# Patient Record
Sex: FEMALE | Race: WHITE | Employment: FULL TIME | URBAN - METROPOLITAN AREA
[De-identification: names, ages, dates, MRNs, and addresses within clinical notes are randomized per-mention and may not be internally consistent; named-entity substitution may affect disease eponyms.]

---

## 2017-03-07 ENCOUNTER — HOSPITAL ENCOUNTER (OUTPATIENT)
Dept: MAMMOGRAPHY | Facility: CLINIC | Age: 57
Discharge: HOME | End: 2017-03-07
Attending: FAMILY MEDICINE | Admitting: FAMILY MEDICINE
Payer: COMMERCIAL

## 2017-03-07 DIAGNOSIS — Z12.31 VISIT FOR SCREENING MAMMOGRAM: ICD-10-CM

## 2017-03-07 PROCEDURE — G0202 SCR MAMMO BI INCL CAD: HCPCS

## 2017-03-07 PROCEDURE — 77063 BREAST TOMOSYNTHESIS BI: CPT

## 2017-04-06 ENCOUNTER — OFFICE VISIT (OUTPATIENT)
Dept: FAMILY MEDICINE | Facility: CLINIC | Age: 57
End: 2017-04-06
Payer: COMMERCIAL

## 2017-04-06 VITALS
WEIGHT: 139 LBS | HEART RATE: 64 BPM | SYSTOLIC BLOOD PRESSURE: 148 MMHG | BODY MASS INDEX: 23.73 KG/M2 | DIASTOLIC BLOOD PRESSURE: 96 MMHG | HEIGHT: 64 IN | RESPIRATION RATE: 16 BRPM | TEMPERATURE: 98.2 F

## 2017-04-06 DIAGNOSIS — F17.200 TOBACCO USE DISORDER: Chronic | ICD-10-CM

## 2017-04-06 DIAGNOSIS — E55.9 VITAMIN D DEFICIENCY: Chronic | ICD-10-CM

## 2017-04-06 DIAGNOSIS — E03.9 HYPOTHYROIDISM, UNSPECIFIED TYPE: Primary | Chronic | ICD-10-CM

## 2017-04-06 DIAGNOSIS — F51.01 PRIMARY INSOMNIA: ICD-10-CM

## 2017-04-06 DIAGNOSIS — R53.83 FATIGUE, UNSPECIFIED TYPE: ICD-10-CM

## 2017-04-06 DIAGNOSIS — R03.0 ELEVATED BLOOD PRESSURE READING WITHOUT DIAGNOSIS OF HYPERTENSION: ICD-10-CM

## 2017-04-06 PROCEDURE — 99214 OFFICE O/P EST MOD 30 MIN: CPT | Performed by: FAMILY MEDICINE

## 2017-04-06 PROCEDURE — 36415 COLL VENOUS BLD VENIPUNCTURE: CPT | Performed by: FAMILY MEDICINE

## 2017-04-06 PROCEDURE — 84443 ASSAY THYROID STIM HORMONE: CPT | Performed by: FAMILY MEDICINE

## 2017-04-06 NOTE — NURSING NOTE
"Chief Complaint   Patient presents with     Thyroid Disease     refills       Initial BP (!) 148/96 (BP Location: Left arm, Cuff Size: Adult Regular)  Pulse 64  Temp 98.2  F (36.8  C) (Tympanic)  Resp 16  Ht 5' 4\" (1.626 m)  Wt 139 lb (63 kg)  Breastfeeding? No  BMI 23.86 kg/m2 Estimated body mass index is 23.86 kg/(m^2) as calculated from the following:    Height as of this encounter: 5' 4\" (1.626 m).    Weight as of this encounter: 139 lb (63 kg).  Medication Reconciliation: complete    Health Maintenance that is potentially due pending provider review:  PHQ9    Possibly completing today per provider review.    Callie Monge, CMA    "

## 2017-04-06 NOTE — MR AVS SNAPSHOT
After Visit Summary   4/6/2017    MS Katharine Harmon    MRN: [id number]           Patient Information     Date Of Birth          1960        Visit Information        Provider Department      4/6/2017 2:00 PM Chicho Koroma MD [facility]        Today's Diagnoses     Hypothyroidism, unspecified type    -  1    Elevated blood pressure reading without diagnosis of hypertension        Tobacco use disorder        Vitamin D deficiency        Primary insomnia        Fatigue, unspecified type           Follow-ups after your visit        Additional Services     SLEEP EVALUATION & MANAGEMENT REFERRAL - ADULT       Please be aware that coverage of these services is subject to the terms and limitations of your health insurance plan.  Call member services at your health plan with any benefit or coverage questions.      Please bring the following to your appointment:    >>   List of current medications   >>   This referral request   >>   Any documents/labs given to you for this referral    [facility] Lab  Ph [phone] (Age 2 and up)                  Future tests that were ordered for you today     Open Future Orders        Priority Expected Expires Ordered    SLEEP EVALUATION & MANAGEMENT REFERRAL - ADULT Routine  4/6/2018 4/6/2017            Who to contact     If you have questions or need follow up information about today's clinic visit or your schedule please contact [facility] directly at [phone].  Normal or non-critical lab and imaging results will be communicated to you by MyChart, letter or phone within 4 business days after the clinic has received the results. If you do not hear from us within 7 days, please contact the clinic through MyChart or phone. If you have a critical or abnormal lab result, we will notify you by phone as soon as possible.  Submit refill requests through AppSociallyt or call your pharmacy and they will forward the refill  "request to us. Please allow 3 business days for your refill to be completed.          Additional Information About Your Visit        MyChart Information     Exos lets you send messages to your doctor, view your test results, renew your prescriptions, schedule appointments and more. To sign up, go to [email or web address] . Click on \"Log in\" on the left side of the screen, which will take you to the Welcome page. Then click on \"Sign up Now\" on the right side of the page.     You will be asked to enter the access code listed below, as well as some personal information. Please follow the directions to create your username and password.     Your access code is: [email or web address]  Expires: 2017  2:59 PM     Your access code will  in 90 days. If you need help or a new code, please call your [place] clinic or [phone].        Care EveryWhere ID     This is your [facility] EveryWhere ID. This could be used by other organizations to access your [place] medical records  [id number]        Your Vitals Were     Pulse Temperature Respirations Height Breastfeeding? BMI (Body Mass Index)    64 98.2  F (36.8  C) (Tympanic) 16 5' 4\" (1.626 m) No 23.86 kg/m2       Blood Pressure from Last 3 Encounters:   17 (!) 148/96   16 152/88   16 156/90    Weight from Last 3 Encounters:   17 139 lb (63 kg)   16 141 lb (64 kg)   16 143 lb (64.9 kg)              We Performed the Following     TSH with free T4 reflex        Primary Care Provider Office Phone # Fax #    Chicho Koroma [phone]       [facility] [address] [facility] [address]        Thank you!     Thank you for choosing [facility]  for your care. Our goal is always to provide you with excellent care. Hearing back from our patients is one way we can continue to improve our services. Please take a few minutes to complete the written survey that you may receive in the mail after " your visit with us. Thank you!             Your Updated Medication List - Protect others around you: Learn how to safely use, store and throw away your medicines at www.disposemymeds.org.          This list is accurate as of: 4/6/17  3:15 PM.  Always use your most recent med list.                   Brand Name Dispense Instructions for use    levothyroxine 88 MCG tablet    SYNTHROID    90 tablet    Take 1 tablet (88 mcg) by mouth daily       * nicotine 14 MG/24HR 24 hr patch    NICODERM CQ    30 patch    Place 1 patch onto the skin every 24 hours       * nicotine 7 MG/24HR 24 hr patch    NICODERM CQ    30 patch    Place 1 patch onto the skin every 24 hours       ONE DAILY WOMENS 50+ PO      Take 1 tablet by mouth every evening       * Notice:  This list has 2 medication(s) that are the same as other medications prescribed for you. Read the directions carefully, and ask your doctor or other care provider to review them with you.

## 2017-04-06 NOTE — PROGRESS NOTES
"  SUBJECTIVE:                                                    Katharine Harmon is a 56 year old female who presents to clinic today for the following health issues:       Hypothyroidism Follow-up      Since last visit, patient describes the following symptoms: dry skin and fatigue, not sleeping well       Amount of exercise or physical activity: staying active    Problems taking medications regularly: No    Medication side effects: none    Diet: regular (no restrictions)      S: Katharine Harmon is a 56 year old female with hypothyroid.  Fills and labs  Insomnia: snores, cleft palate in past, s/p repair.  Not sleeping well, tired.  Not sure why.  Wonders about sleep test  Vit D deficiency:  Takes replacement a few days a week.   Tobacco use: trying to quit  Borderline BP: following at home with machine, calibrated well today.  Runs just around upper limit.      Problem list, med list, additional histories reviewed and updated, as indicated.      No cp or sob    O:BP (!) 148/96 (BP Location: Left arm, Cuff Size: Adult Regular)  Pulse 64  Temp 98.2  F (36.8  C) (Tympanic)  Resp 16  Ht 5' 4\" (1.626 m)  Wt 139 lb (63 kg)  Breastfeeding? No  BMI 23.86 kg/m2  GEN: Alert and oriented, in no acute distress  CV: RRR, no murmur  RESP: lungs clear bilaterally, good effort  EXT: no edema or lesions noted in lower extremities    A: hypothyroid.  Fills and labs  Insomnia: snores, cleft palate in past, s/p repair.    Vit D deficiency:  Takes replacement a few days a week.   Tobacco use: trying to quit  Borderline BP: following at home with machine    P: wants to keep an eye on bP.  Knows quitting smoking would help the most.  Willing to start meds in future if needed.    Sleep study seems reasonable.   Continue vit D as above   Is motivated to use patches to quit, encouraged here.   Might try melatonin lower dose, didn't like higher dose.  Tylenol PM an option short term as well    Tobacco Cessation Action Plan: thinking " about it, see above

## 2017-04-07 DIAGNOSIS — E03.9 HYPOTHYROIDISM: ICD-10-CM

## 2017-04-07 LAB — TSH SERPL DL<=0.005 MIU/L-ACNC: 1.1 MU/L (ref 0.4–4)

## 2017-04-07 RX ORDER — LEVOTHYROXINE SODIUM 88 UG/1
TABLET ORAL
Qty: 90 TABLET | Refills: 1 | Status: SHIPPED | OUTPATIENT
Start: 2017-04-07 | End: 2017-11-24

## 2017-04-07 NOTE — TELEPHONE ENCOUNTER
Notes Recorded by Chicho Koroma MD on 4/7/2017 at 2:48 PM  please send patient results    Thyroid testing showing that you're on the  right dose of thyroid hormone.  No need to make any changes.    I hope things are going well.    Refilled.  Stephanie Alvarez RN

## 2017-04-07 NOTE — TELEPHONE ENCOUNTER
levothyroxine (SYNTHROID) 88 MCG tablet     Last Written Prescription Date: 4/18/2016  Last Quantity: 90, # refills: 3  Last Office Visit with [facility], [facility] or [facility] prescribing provider: 4/6/2017        TSH   Date Value Ref Range Status   04/06/2017 1.10 0.40 - 4.00 mU/L Final

## 2017-04-10 ASSESSMENT — ANXIETY QUESTIONNAIRES
6. BECOMING EASILY ANNOYED OR IRRITABLE: NOT AT ALL
2. NOT BEING ABLE TO STOP OR CONTROL WORRYING: NOT AT ALL
7. FEELING AFRAID AS IF SOMETHING AWFUL MIGHT HAPPEN: NOT AT ALL
1. FEELING NERVOUS, ANXIOUS, OR ON EDGE: NOT AT ALL
GAD7 TOTAL SCORE: 3
5. BEING SO RESTLESS THAT IT IS HARD TO SIT STILL: NOT AT ALL
3. WORRYING TOO MUCH ABOUT DIFFERENT THINGS: MORE THAN HALF THE DAYS

## 2017-04-10 ASSESSMENT — PATIENT HEALTH QUESTIONNAIRE - PHQ9: 5. POOR APPETITE OR OVEREATING: SEVERAL DAYS

## 2017-04-11 ASSESSMENT — PATIENT HEALTH QUESTIONNAIRE - PHQ9: SUM OF ALL RESPONSES TO PHQ QUESTIONS 1-9: 12

## 2017-04-11 ASSESSMENT — ANXIETY QUESTIONNAIRES: GAD7 TOTAL SCORE: 3

## 2017-11-08 ENCOUNTER — TELEPHONE (OUTPATIENT)
Dept: FAMILY MEDICINE | Facility: CLINIC | Age: 57
End: 2017-11-08

## 2017-11-08 NOTE — TELEPHONE ENCOUNTER
Katharine has appointment today at 11:20am with Dr Koroma states made yesterday when b/p running high 188/119 [place] [facility] a lot of anxiety. States this am 124/84 and 123/87 so asking if she should keep appointment.    Kacey Perrin CSS

## 2017-11-08 NOTE — TELEPHONE ENCOUNTER
BP Readings from Last 6 Encounters:   04/06/17 (!) 148/96   08/01/16 152/88   06/28/16 156/90   04/07/16 (!) 146/92   02/19/16 128/60   12/02/15 (!) 173/95     Spoke to pt, discussed monitoring her BP readings at home, recording them, discussed s/s of hypertension and also informed her she can make an appt for BP check only also, pt agrees and will cancel her appt.  Stephanie Alvarez RN

## 2017-11-20 ENCOUNTER — ALLIED HEALTH/NURSE VISIT (OUTPATIENT)
Dept: FAMILY MEDICINE | Facility: CLINIC | Age: 57
End: 2017-11-20
Payer: COMMERCIAL

## 2017-11-20 VITALS — DIASTOLIC BLOOD PRESSURE: 84 MMHG | SYSTOLIC BLOOD PRESSURE: 134 MMHG | HEART RATE: 68 BPM

## 2017-11-20 DIAGNOSIS — R03.0 ELEVATED BLOOD PRESSURE READING WITHOUT DIAGNOSIS OF HYPERTENSION: Primary | ICD-10-CM

## 2017-11-20 PROCEDURE — 99207 ZZC NO CHARGE NURSE ONLY: CPT

## 2017-11-20 NOTE — PROGRESS NOTES
S-(situation): RN visit for BP check.     B-(background): Hx elevated BP, no antihypertensive meds. States BP elevated at dentist appt last week- unable to proceed with appt. Home readings variable- had 148/119 reading.    A-(assessment):   BP Readings from Last 6 Encounters:   11/20/17 134/84   04/06/17 (!) 148/96   08/01/16 152/88   06/28/16 156/90   04/07/16 (!) 146/92   02/19/16 128/60     HR 68.  States has had some pressure in ears, nasal congestion. Has felt tired for some time.    R-(recommendations): Advised F/U appt with PCP, bring home readings/ BP monitor in for comparison.  Pt voices understanding/ agreement. YANET Luis RN

## 2017-11-20 NOTE — MR AVS SNAPSHOT
"              After Visit Summary   2017    Katharine Harmon    MRN: [id number]           Patient Information     Date Of Birth          1960        Visit Information        Provider Department      2017 2:45 PM SACHIN BENNETT RN [facility]        Today's Diagnoses     Elevated blood pressure reading without diagnosis of hypertension    -  1       Follow-ups after your visit        Who to contact     If you have questions or need follow up information about today's clinic visit or your schedule please contact [facility] directly at [phone].  Normal or non-critical lab and imaging results will be communicated to you by DigitalVisionhart, letter or phone within 4 business days after the clinic has received the results. If you do not hear from us within 7 days, please contact the clinic through DigitalVisionhart or phone. If you have a critical or abnormal lab result, we will notify you by phone as soon as possible.  Submit refill requests through Festicket or call your pharmacy and they will forward the refill request to us. Please allow 3 business days for your refill to be completed.          Additional Information About Your Visit        MyChart Information     Festicket lets you send messages to your doctor, view your test results, renew your prescriptions, schedule appointments and more. To sign up, go to [email or web address] . Click on \"Log in\" on the left side of the screen, which will take you to the Welcome page. Then click on \"Sign up Now\" on the right side of the page.     You will be asked to enter the access code listed below, as well as some personal information. Please follow the directions to create your username and password.     Your access code is: [email or web address]  Expires: 2018  3:02 PM     Your access code will  in 90 days. If you need help or a new code, please call your [facility] or [phone].        Care EveryWhere ID     This is your Care EveryWhere ID. " This could be used by other organizations to access your [place] medical records  [id number]        Your Vitals Were     Pulse                   68            Blood Pressure from Last 3 Encounters:   11/20/17 134/84   04/06/17 (!) 148/96   08/01/16 152/88    Weight from Last 3 Encounters:   04/06/17 139 lb (63 kg)   08/01/16 141 lb (64 kg)   06/28/16 143 lb (64.9 kg)              Today, you had the following     No orders found for display       Primary Care Provider Office Phone # Fax #    Chicho Koroma [phone] [address] [facility] [address]        Equal Access to Services     AD LORENZO : Micaela barreto Socynthia, waenriqueda jose david, qaybta kaalmada sujey, harmony arroyo . So [facility] [phone].    ATENCIÓN: Si habla español, tiene a baptiste disposición servicios gratuitos de asistencia lingüística. Llame al [phone].    We comply with applicable federal civil rights laws and [place] laws. We do not discriminate on the basis of race, color, national origin, age, disability, sex, sexual orientation, or gender identity.            Thank you!     Thank you for choosing [facility]  for your care. Our goal is always to provide you with excellent care. Hearing back from our patients is one way we can continue to improve our services. Please take a few minutes to complete the written survey that you may receive in the mail after your visit with us. Thank you!             Your Updated Medication List - Protect others around you: Learn how to safely use, store and throw away your medicines at www.disposemymeds.org.          This list is accurate as of: 11/20/17  3:02 PM.  Always use your most recent med list.                   Brand Name Dispense Instructions for use Diagnosis    levothyroxine 88 MCG tablet    SYNTHROID/LEVOTHROID    90 tablet    TAKE ONE TABLET BY MOUTH ONCE DAILY    Hypothyroidism       * nicotine 14 MG/24HR 24  hr patch    NICODERM CQ    30 patch    Place 1 patch onto the skin every 24 hours    Tobacco use disorder       * nicotine 7 MG/24HR 24 hr patch    NICODERM CQ    30 patch    Place 1 patch onto the skin every 24 hours    Tobacco use disorder       ONE DAILY WOMENS 50+ PO      Take 1 tablet by mouth every evening        * Notice:  This list has 2 medication(s) that are the same as other medications prescribed for you. Read the directions carefully, and ask your doctor or other care provider to review them with you.

## 2017-11-24 DIAGNOSIS — E03.9 HYPOTHYROIDISM: ICD-10-CM

## 2017-11-24 RX ORDER — LEVOTHYROXINE SODIUM 88 UG/1
TABLET ORAL
Qty: 90 TABLET | Refills: 1 | Status: SHIPPED | OUTPATIENT
Start: 2017-11-24 | End: 2018-05-21

## 2017-11-24 NOTE — TELEPHONE ENCOUNTER
TSH   Date Value Ref Range Status   04/06/2017 1.10 0.40 - 4.00 mU/L Final   04/07/2016 1.37 0.40 - 4.00 mU/L Final   09/16/2015 0.57 0.40 - 4.00 mU/L Final     Refilled.  YANET Luis, RN

## 2018-02-07 ENCOUNTER — TELEPHONE (OUTPATIENT)
Dept: FAMILY MEDICINE | Facility: CLINIC | Age: 58
End: 2018-02-07

## 2018-02-07 ENCOUNTER — ALLIED HEALTH/NURSE VISIT (OUTPATIENT)
Dept: FAMILY MEDICINE | Facility: CLINIC | Age: 58
End: 2018-02-07
Payer: COMMERCIAL

## 2018-02-07 ENCOUNTER — OFFICE VISIT (OUTPATIENT)
Dept: FAMILY MEDICINE | Facility: CLINIC | Age: 58
End: 2018-02-07
Payer: COMMERCIAL

## 2018-02-07 ENCOUNTER — HOSPITAL ENCOUNTER (EMERGENCY)
Facility: CLINIC | Age: 58
Discharge: HOME | End: 2018-02-07
Attending: FAMILY MEDICINE | Admitting: FAMILY MEDICINE
Payer: COMMERCIAL

## 2018-02-07 ENCOUNTER — APPOINTMENT (OUTPATIENT)
Dept: GENERAL RADIOLOGY | Facility: CLINIC | Age: 58
End: 2018-02-07
Attending: FAMILY MEDICINE
Payer: COMMERCIAL

## 2018-02-07 VITALS
BODY MASS INDEX: 23.05 KG/M2 | WEIGHT: 135 LBS | HEART RATE: 74 BPM | SYSTOLIC BLOOD PRESSURE: 125 MMHG | DIASTOLIC BLOOD PRESSURE: 86 MMHG | OXYGEN SATURATION: 95 % | HEIGHT: 64 IN | RESPIRATION RATE: 11 BRPM | TEMPERATURE: 97.9 F

## 2018-02-07 VITALS — DIASTOLIC BLOOD PRESSURE: 98 MMHG | HEART RATE: 84 BPM | SYSTOLIC BLOOD PRESSURE: 170 MMHG

## 2018-02-07 DIAGNOSIS — R06.02 SHORTNESS OF BREATH: Primary | ICD-10-CM

## 2018-02-07 DIAGNOSIS — R20.2 PARESTHESIA: ICD-10-CM

## 2018-02-07 DIAGNOSIS — R03.0 ELEVATED BLOOD PRESSURE READING WITHOUT DIAGNOSIS OF HYPERTENSION: ICD-10-CM

## 2018-02-07 DIAGNOSIS — R06.4 HYPERVENTILATION: ICD-10-CM

## 2018-02-07 DIAGNOSIS — Z01.30 BLOOD PRESSURE CHECK: Primary | ICD-10-CM

## 2018-02-07 DIAGNOSIS — R42 DIZZINESS: ICD-10-CM

## 2018-02-07 LAB
ANION GAP SERPL CALCULATED.3IONS-SCNC: 9 MMOL/L (ref 3–14)
BASOPHILS # BLD AUTO: 0.1 10E9/L (ref 0–0.2)
BASOPHILS NFR BLD AUTO: 0.6 %
BUN SERPL-MCNC: 14 MG/DL (ref 7–30)
CALCIUM SERPL-MCNC: 9.5 MG/DL (ref 8.5–10.1)
CHLORIDE SERPL-SCNC: 106 MMOL/L (ref 94–109)
CO2 SERPL-SCNC: 25 MMOL/L (ref 20–32)
CREAT SERPL-MCNC: 0.72 MG/DL (ref 0.52–1.04)
DIFFERENTIAL METHOD BLD: ABNORMAL
EOSINOPHIL # BLD AUTO: 0.1 10E9/L (ref 0–0.7)
EOSINOPHIL NFR BLD AUTO: 0.7 %
ERYTHROCYTE [DISTWIDTH] IN BLOOD BY AUTOMATED COUNT: 14 % (ref 10–15)
GFR SERPL CREATININE-BSD FRML MDRD: 83 ML/MIN/1.7M2
GLUCOSE SERPL-MCNC: 90 MG/DL (ref 70–99)
HCT VFR BLD AUTO: 46.1 % (ref 35–47)
HGB BLD-MCNC: 16.1 G/DL (ref 11.7–15.7)
IMM GRANULOCYTES # BLD: 0 10E9/L (ref 0–0.4)
IMM GRANULOCYTES NFR BLD: 0.2 %
LYMPHOCYTES # BLD AUTO: 3 10E9/L (ref 0.8–5.3)
LYMPHOCYTES NFR BLD AUTO: 27.2 %
MCH RBC QN AUTO: 30.1 PG (ref 26.5–33)
MCHC RBC AUTO-ENTMCNC: 34.9 G/DL (ref 31.5–36.5)
MCV RBC AUTO: 86 FL (ref 78–100)
MONOCYTES # BLD AUTO: 1 10E9/L (ref 0–1.3)
MONOCYTES NFR BLD AUTO: 9.1 %
NEUTROPHILS # BLD AUTO: 6.8 10E9/L (ref 1.6–8.3)
NEUTROPHILS NFR BLD AUTO: 62.2 %
PLATELET # BLD AUTO: 388 10E9/L (ref 150–450)
POTASSIUM SERPL-SCNC: 3.5 MMOL/L (ref 3.4–5.3)
RBC # BLD AUTO: 5.35 10E12/L (ref 3.8–5.2)
SODIUM SERPL-SCNC: 140 MMOL/L (ref 133–144)
TROPONIN I SERPL-MCNC: <0.015 UG/L (ref 0–0.04)
TSH SERPL DL<=0.005 MIU/L-ACNC: 2.12 MU/L (ref 0.4–4)
WBC # BLD AUTO: 10.9 10E9/L (ref 4–11)

## 2018-02-07 PROCEDURE — 93005 ELECTROCARDIOGRAM TRACING: CPT | Performed by: FAMILY MEDICINE

## 2018-02-07 PROCEDURE — 25000128 H RX IP 250 OP 636: Performed by: FAMILY MEDICINE

## 2018-02-07 PROCEDURE — 99215 OFFICE O/P EST HI 40 MIN: CPT | Performed by: FAMILY MEDICINE

## 2018-02-07 PROCEDURE — 93010 ELECTROCARDIOGRAM REPORT: CPT | Mod: Z6 | Performed by: FAMILY MEDICINE

## 2018-02-07 PROCEDURE — 96374 THER/PROPH/DIAG INJ IV PUSH: CPT | Performed by: FAMILY MEDICINE

## 2018-02-07 PROCEDURE — 71046 X-RAY EXAM CHEST 2 VIEWS: CPT

## 2018-02-07 PROCEDURE — 99285 EMERGENCY DEPT VISIT HI MDM: CPT | Mod: 25 | Performed by: FAMILY MEDICINE

## 2018-02-07 PROCEDURE — 80048 BASIC METABOLIC PNL TOTAL CA: CPT | Performed by: FAMILY MEDICINE

## 2018-02-07 PROCEDURE — 84443 ASSAY THYROID STIM HORMONE: CPT | Performed by: FAMILY MEDICINE

## 2018-02-07 PROCEDURE — 85025 COMPLETE CBC W/AUTO DIFF WBC: CPT | Performed by: FAMILY MEDICINE

## 2018-02-07 PROCEDURE — 84484 ASSAY OF TROPONIN QUANT: CPT | Performed by: FAMILY MEDICINE

## 2018-02-07 PROCEDURE — 93000 ELECTROCARDIOGRAM COMPLETE: CPT | Performed by: FAMILY MEDICINE

## 2018-02-07 PROCEDURE — 99207 ZZC NO CHARGE NURSE ONLY: CPT

## 2018-02-07 RX ORDER — LORAZEPAM 2 MG/ML
0.5 INJECTION INTRAMUSCULAR
Status: DISCONTINUED | OUTPATIENT
Start: 2018-02-07 | End: 2018-02-07 | Stop reason: HOSPADM

## 2018-02-07 RX ADMIN — LORAZEPAM 0.5 MG: 2 INJECTION INTRAMUSCULAR at 17:15

## 2018-02-07 NOTE — ED AVS SNAPSHOT
[facility] Emergency Department    [address] [address]    Phone:  [phone]    Fax:  [phone]                                       Katharine Harmon   MRN: [id number]    Department:  [facility] Emergency Department   Date of Visit:  2/7/2018           After Visit Summary Signature Page     I have received my discharge instructions, and my questions have been answered. I have discussed any challenges I see with this plan with the nurse or doctor.    ..........................................................................................................................................  Patient/Patient Representative Signature      ..........................................................................................................................................  Patient Representative Print Name and Relationship to Patient    ..................................................               ................................................  Date                                            Time    ..........................................................................................................................................  Reviewed by Signature/Title    ...................................................              ..............................................  Date                                                            Time

## 2018-02-07 NOTE — TELEPHONE ENCOUNTER
"Hour ago was feeling heart was racing, dizzy, short of breath, feet and hands are tingly, no chest pain, but patient is short of breath, and feels \"fuzzy\". Patient is unsteady with walking and seems anxious. Discussed with Dr. Miller who visually evaluated patient and advised EKG now. VS are elevated B/P 170/98, pulse is 84. Dr. Miller advised ER, patient called friend who will take the patient to ER at [place]. Friend Katharine is here to escort the patient down to [place].  DIEGO Burns  "

## 2018-02-07 NOTE — ED AVS SNAPSHOT
[facility] Emergency Department    [address] [address]    Phone:  [phone]    Fax:  [phone]                                       Katharine Harmon   MRN: [id number]    Department:  [facility] Emergency Department   Date of Visit:  2/7/2018           Patient Information     Date Of Birth          1960        Your diagnoses for this visit were:     Hyperventilation        You were seen by Omar Castañeda MD.        Discharge Instructions       Return to the Emergency Room if the following occurs:     New chest pain, worsened breathing, fainting, or for any concern at anytime.    Or, follow-up with the following provider as we discussed:     Return to your primary doctor this week or next for reevaluation.    Medications discussed:    None new.  No changes.    If you received pain-relieving or sedating medication during your time in the ER, avoid alcohol, driving automobiles, or working with machinery.  Also, a responsible adult must stay with you.        Call the Nurse Advice Line at [phone] or [phone] for any concern at anytime.      24 Hour Appointment Hotline       To make an appointment at any [facility] clinic, call [phone] ([phone]). If you don't have a family doctor or clinic, we will help you find one. [facility] clinics are conveniently located to serve the needs of you and your family.             Review of your medicines      Our records show that you are taking the medicines listed below. If these are incorrect, please call your family doctor or clinic.        Dose / Directions Last dose taken    levothyroxine 88 MCG tablet   Commonly known as:  SYNTHROID/LEVOTHROID   Quantity:  90 tablet        TAKE ONE TABLET BY MOUTH ONCE DAILY   Refills:  1        ONE DAILY WOMENS 50+ PO   Dose:  1 tablet        Take 1 tablet by mouth every evening   Refills:  0                Procedures and tests performed during your visit     Basic metabolic  panel    CBC with platelets, differential    EKG 12-lead, tracing only    TSH with free T4 reflex    Troponin I    XR Chest 2 Views      Orders Needing Specimen Collection     None      Pending Results     Date and Time Order Name Status Description    2/7/2018 1740 XR Chest 2 Views Preliminary             Pending Culture Results     No orders found from 2/5/2018 to 2/8/2018.            Pending Results Instructions     If you had any lab results that were not finalized at the time of your Discharge, you can call the ED Lab Result RN at [phone]. You will be contacted by this team for any positive Lab results or changes in treatment. The nurses are available 7 days a week from 10A to 6:30P.  You can leave a message 24 hours per day and they will return your call.        Test Results From Your Hospital Stay        2/7/2018  4:57 PM      Component Results     Component Value Ref Range & Units Status    WBC 10.9 4.0 - 11.0 10e9/L Final    RBC Count 5.35 (H) 3.8 - 5.2 10e12/L Final    Hemoglobin 16.1 (H) 11.7 - 15.7 g/dL Final    Hematocrit 46.1 35.0 - 47.0 % Final    MCV 86 78 - 100 fl Final    MCH 30.1 26.5 - 33.0 pg Final    MCHC 34.9 31.5 - 36.5 g/dL Final    RDW 14.0 10.0 - 15.0 % Final    Platelet Count 388 150 - 450 10e9/L Final    Diff Method Automated Method  Final    % Neutrophils 62.2 % Final    % Lymphocytes 27.2 % Final    % Monocytes 9.1 % Final    % Eosinophils 0.7 % Final    % Basophils 0.6 % Final    % Immature Granulocytes 0.2 % Final    Absolute Neutrophil 6.8 1.6 - 8.3 10e9/L Final    Absolute Lymphocytes 3.0 0.8 - 5.3 10e9/L Final    Absolute Monocytes 1.0 0.0 - 1.3 10e9/L Final    Absolute Eosinophils 0.1 0.0 - 0.7 10e9/L Final    Absolute Basophils 0.1 0.0 - 0.2 10e9/L Final    Abs Immature Granulocytes 0.0 0 - 0.4 10e9/L Final         2/7/2018  5:15 PM      Component Results     Component Value Ref Range & Units Status    Sodium 140 133 - 144 mmol/L Final    Potassium 3.5 3.4 - 5.3 mmol/L  "Final    Chloride 106 94 - 109 mmol/L Final    Carbon Dioxide 25 20 - 32 mmol/L Final    Anion Gap 9 3 - 14 mmol/L Final    Glucose 90 70 - 99 mg/dL Final    Urea Nitrogen 14 7 - 30 mg/dL Final    Creatinine 0.72 0.52 - 1.04 mg/dL Final    GFR Estimate 83 >60 mL/min/1.7m2 Final    Non  GFR Calc    GFR Estimate If Black >90 >60 mL/min/1.7m2 Final    African American GFR Calc    Calcium 9.5 8.5 - 10.1 mg/dL Final         2/7/2018  5:15 PM      Component Results     Component Value Ref Range & Units Status    Troponin I ES <0.015 0.000 - 0.045 ug/L Final    The 99th percentile for upper reference range is 0.045 ug/L.  Troponin values   in the range of 0.045 - 0.120 ug/L may be associated with risks of adverse   clinical events.           2/7/2018  6:18 PM      Component Results     Component Value Ref Range & Units Status    TSH 2.12 0.40 - 4.00 mU/L Final         2/7/2018  6:01 PM      Narrative     CHEST TWO VIEWS    2/7/2018 6:00 PM     HISTORY: Shortness of breath.    COMPARISON: None.    FINDINGS: The heart size is normal. The lungs are clear. No  pneumothorax or pleural effusion.        Impression     IMPRESSION: No acute abnormality.                Thank you for choosing [facility]       Thank you for choosing [facility] for your care. Our goal is always to provide you with excellent care. Hearing back from our patients is one way we can continue to improve our services. Please take a few minutes to complete the written survey that you may receive in the mail after you visit with us. Thank you!        Zarpo Information     Zarpo lets you send messages to your doctor, view your test results, renew your prescriptions, schedule appointments and more. To sign up, go to [email or web address] . Click on \"Log in\" on the left side of the screen, which will take you to the Welcome page. Then click on \"Sign up Now\" on the right side of the page.     You will be asked to enter the access code listed " below, as well as some personal information. Please follow the directions to create your username and password.     Your access code is: [email or web address]  Expires: 2018  3:02 PM     Your access code will  in 90 days. If you need help or a new code, please call your [place] clinic or [phone].        Care EveryWhere ID     This is your Care EveryWhere ID. This could be used by other organizations to access your [place] medical records  [id number]        Equal Access to Services     MINAL [facility]ROD[facility]IGO : Micaela barreto Socynthia, wanoe luqadaha, qasusan kaalmasusan rm, harmony arroyo . So [facility] [phone].    ATENCIÓN: Si habla español, tiene a baptiste disposición servicios gratuitos de asistencia lingüística. Llame al [phone].    We comply with applicable federal civil rights laws and [place] laws. We do not discriminate on the basis of race, color, national origin, age, disability, sex, sexual orientation, or gender identity.            After Visit Summary       This is your record. Keep this with you and show to your community pharmacist(s) and doctor(s) at your next visit.

## 2018-02-07 NOTE — PROGRESS NOTES
SUBJECTIVE:   Katharine Harmon is a 57 year old female who presents to clinic today for the following health issues:    Shortness of breath  57-year-old m female presents with shortness of breath, dizziness/lightheadedness along with numbness and tingling.  Symptoms started at around noon today.  She denies any chest pain, palpitation, cough, bowel/bladder or other relevant systemic symptoms.  Past medical history significant for hyperlipidemia, hypothyroidism, tobacco abuse and depression.  She concerns that elevated blood pressure might be contributing her symptoms.  She smokes cigarettes daily, drinks alcohol occasionally and denies using any illicit drugs.        Problem list and histories reviewed & adjusted, as indicated.  Additional history: as documented    Patient Active Problem List   Diagnosis     Fracture dislocation of ankle     Hypothyroidism     Vitamin D deficiency     Tobacco use disorder     Cleft palate     Hyperlipidemia with target LDL less than 100     Major depressive disorder, single episode, moderate (H)     Elevated blood pressure reading without diagnosis of hypertension     S/P hysterectomy     Chronic pain of right ankle     Past Surgical History:   Procedure Laterality Date     ARTHROSCOPY ANKLE Right 6/25/2015    Procedure: ARTHROSCOPY ANKLE;  Surgeon: Riley Hilton DPM;  Location: [facility]     C THYROID ABLATION  1999     HYSTERECTOMY VAGINAL      heavy menses     OPEN REDUCTION INTERNAL FIXATION ANKLE  4/23/2014    Procedure: OPEN REDUCTION INTERNAL FIXATION ANKLE;  Surgeon: Riley Hilton DPM;  Location: [facility]     REMOVE HARDWARE ANKLE Right 6/25/2015    Procedure: REMOVE HARDWARE ANKLE;  Surgeon: Riley Hilton DPM;  Location: [facility]     REPAIR CLEFT PALATE INFANT  age 6 months       Social History   Substance Use Topics     Smoking status: Current Every Day Smoker     Packs/day: 0.40     Years: 35.00     Types: Cigarettes     Smokeless tobacco: Never Used       Comment: Currently using patches - trying to quit     Alcohol use 0.0 oz/week     0 Standard drinks or equivalent per week      Comment: rare     Family History   Problem Relation Age of Onset     DIABETES Maternal Grandfather          Current Outpatient Prescriptions   Medication Sig Dispense Refill     levothyroxine (SYNTHROID/LEVOTHROID) 88 MCG tablet TAKE ONE TABLET BY MOUTH ONCE DAILY 90 tablet 1     Multiple Vitamins-Minerals (ONE DAILY WOMENS 50+ PO) Take 1 tablet by mouth every evening       Allergies   Allergen Reactions     Penicillins GI Disturbance     Recent Labs   Lab Test  02/07/18   1645  04/06/17   1502   09/16/15   1352 09/09/15   LDL   --    --    --   135*   --    HDL   --    --    --   55   --    TRIG   --    --    --   224*   --    ALT   --    --    --    --   46   CR  0.72   --    --    --   0.90   GFRESTIMATED  83   --    --    --    --    GFRESTBLACK  >90   --    --    --    --    POTASSIUM  3.5   --    --    --   4.3   TSH  2.12  1.10   < >  0.57   --     < > = values in this interval not displayed.      BP Readings from Last 3 Encounters:   02/07/18 125/86   02/07/18 (!) 170/98   02/07/18 (!) 170/98    Wt Readings from Last 3 Encounters:   02/07/18 135 lb (61.2 kg)   04/06/17 139 lb (63 kg)   08/01/16 141 lb (64 kg)                  Labs reviewed in EPIC    Reviewed and updated as needed this visit by clinical staff       Reviewed and updated as needed this visit by Provider         ROS:  Constitutional, HEENT, cardiovascular, pulmonary, GI, , musculoskeletal, neuro, skin, endocrine and psych systems are negative, except as otherwise noted.    OBJECTIVE:   BP (!) 170/98 (BP Location: Right arm, Patient Position: Sitting, Cuff Size: Adult Large)  Pulse 84  SpO2 97%  GENERAL: alert, looks anxious and dyspneic  EYES: Eyes grossly normal to inspection, PERRL and conjunctivae and sclerae normal  HENT: ear canals and TM's normal, nose and mouth without ulcers or lesions  NECK: no adenopathy,  no asymmetry, masses, or scars and thyroid normal to palpation  RESP: lungs clear to auscultation - no rales, rhonchi or wheezes  CV: regular rates and rhythm, normal S1 S2, no S3 or S4, no murmur, click or rub and peripheral pulses strong  ABDOMEN: soft, nontender, no hepatosplenomegaly, no masses and bowel sounds normal  MS: no gross musculoskeletal defects noted, no edema  SKIN: no suspicious lesions or rashes  NEURO: Normal strength and tone, mentation intact and speech normal  PSYCH: anxious, judgement and insight intact and appearance well groomed    ECG: Sinus rhythm, no ischemic changes noted    ASSESSMENT/PLAN:         ICD-10-CM    1. Shortness of breath R06.02 EKG 12-lead complete w/read - Clinics   2. Dizziness R42    3. Paresthesia R20.2    4. Elevated blood pressure reading without diagnosis of hypertension R03.0        57-year-old female presents with shortness of breath, dizziness and numbness/tingling involving both hands.  Blood pressure in clinic noted to be 170/98.  She appears anxious and dyspneic but otherwise physical examination quite unremarkable.  Past medical history significant for hyperlipidemia, hypothyroidism, tobacco abuse and depression.  EKG came back normal.  Differentials are broad including coronary ischemia, PE, uncontrolled hypertension and panic attack.  Needs further evaluation to delineate a specific diagnosis.  Recommended to go to ER.  ER physician informed.  Patient deferred ambulance, friend will be driving her to [place] ER.  Patient understood and in agreement with above plan.  All questions answered.  I spent 25 minutes during this encounter, greater than 50% of the time was spent on education, counseling, reviewing the plan of care, and coordination in regards to her specific conditions as described above.           Bert Miller MD  [facility]

## 2018-02-07 NOTE — MR AVS SNAPSHOT
"              After Visit Summary   2018    Katharine Harmon    MRN: [id number]           Patient Information     Date Of Birth          1960        Visit Information        Provider Department      2018 3:20 PM Bert Miller MD [facility]        Today's Diagnoses     Shortness of breath    -  1    Dizziness        Paresthesia        Elevated blood pressure reading without diagnosis of hypertension           Follow-ups after your visit        Who to contact     If you have questions or need follow up information about today's clinic visit or your schedule please contact [facility] directly at [phone].  Normal or non-critical lab and imaging results will be communicated to you by [employer], letter or phone within 4 business days after the clinic has received the results. If you do not hear from us within 7 days, please contact the clinic through [employer] or phone. If you have a critical or abnormal lab result, we will notify you by phone as soon as possible.  Submit refill requests through [employer]EX or call your pharmacy and they will forward the refill request to us. Please allow 3 business days for your refill to be completed.          Additional Information About Your Visit        MyChart Information     GMEX lets you send messages to your doctor, view your test results, renew your prescriptions, schedule appointments and more. To sign up, go to [email or web address] . Click on \"Log in\" on the left side of the screen, which will take you to the Welcome page. Then click on \"Sign up Now\" on the right side of the page.     You will be asked to enter the access code listed below, as well as some personal information. Please follow the directions to create your username and password.     Your access code is: [email or web address]  Expires: 2018  3:02 PM     Your access code will  in 90 days. If you need help or a new code, please call your [facility] or " [phone].        Care EveryWhere ID     This is your Care EveryWhere ID. This could be used by other organizations to access your [facility] medical records  [id number]        Your Vitals Were     Pulse Pulse Oximetry                84 97%           Blood Pressure from Last 3 Encounters:   02/07/18 125/86   02/07/18 (!) 170/98   02/07/18 (!) 170/98    Weight from Last 3 Encounters:   02/07/18 135 lb (61.2 kg)   04/06/17 139 lb (63 kg)   08/01/16 141 lb (64 kg)              We Performed the Following     EKG 12-lead complete w/read - Clinics        Primary Care Provider Office Phone # Fax #    Chicho Koroma [phone]       [address] [place] [phone]        Equal Access to Services     [facility] MAURA : Hadii elder quintana hadasho Soomaali, waaxda luqadaha, qaybta kaalmada adecheyenneyasusan, harmony arroyo . So [facility] [phone].    ATENCIÓN: Si habla español, tiene a baptiste disposición servicios gratuitos de asistencia lingüística. Katiana al [phone].    We comply with applicable federal civil rights laws and [place] laws. We do not discriminate on the basis of race, color, national origin, age, disability, sex, sexual orientation, or gender identity.            Thank you!     Thank you for choosing [facility]  for your care. Our goal is always to provide you with excellent care. Hearing back from our patients is one way we can continue to improve our services. Please take a few minutes to complete the written survey that you may receive in the mail after your visit with us. Thank you!             Your Updated Medication List - Protect others around you: Learn how to safely use, store and throw away your medicines at www.disposemymeds.org.          This list is accurate as of 2/7/18 11:59 PM.  Always use your most recent med list.                   Brand Name Dispense Instructions for use Diagnosis    levothyroxine 88 MCG tablet     SYNTHROID/LEVOTHROID    90 tablet    TAKE ONE TABLET BY MOUTH ONCE DAILY    Hypothyroidism       ONE DAILY WOMENS 50+ PO      Take 1 tablet by mouth every evening
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
## 2018-02-07 NOTE — MR AVS SNAPSHOT
"              After Visit Summary   2018    Katharine Harmon    MRN: [id number]           Patient Information     Date Of Birth          1960        Visit Information        Provider Department      2018 3:00 PM SACHIN BENNETT RN [facility]        Today's Diagnoses     Blood pressure check    -  1       Follow-ups after your visit        Who to contact     If you have questions or need follow up information about today's clinic visit or your schedule please contact [facility] directly at [phone].  Normal or non-critical lab and imaging results will be communicated to you by [employer], letter or phone within 4 business days after the clinic has received the results. If you do not hear from us within 7 days, please contact the clinic through [employer] or phone. If you have a critical or abnormal lab result, we will notify you by phone as soon as possible.  Submit refill requests through Nextdoor or call your pharmacy and they will forward the refill request to us. Please allow 3 business days for your refill to be completed.          Additional Information About Your Visit        MyChart Information     Nextdoor lets you send messages to your doctor, view your test results, renew your prescriptions, schedule appointments and more. To sign up, go to [email or web address] . Click on \"Log in\" on the left side of the screen, which will take you to the Welcome page. Then click on \"Sign up Now\" on the right side of the page.     You will be asked to enter the access code listed below, as well as some personal information. Please follow the directions to create your username and password.     Your access code is: [email or web address]  Expires: 2018  3:02 PM     Your access code will  in 90 days. If you need help or a new code, please call your [facility] or [phone].        Care EveryWhere ID     This is your Care EveryWhere ID. This could be used by other organizations to " access your [place] medical records  [id number]        Your Vitals Were     Pulse                   84            Blood Pressure from Last 3 Encounters:   02/07/18 (!) 170/98   11/20/17 134/84   04/06/17 (!) 148/96    Weight from Last 3 Encounters:   04/06/17 139 lb (63 kg)   08/01/16 141 lb (64 kg)   06/28/16 143 lb (64.9 kg)              Today, you had the following     No orders found for display       Primary Care Provider Office Phone # Fax #    Chicho Koroma [phone]       [address] [place] [phone]        Equal Access to Services     [facility]: Hadii elder quintana hadasho Socynthia, waaxda luqadaha, qaybta kaalmada adecheyenneyada, harmony arroyo . So [facility] [phone].    ATENCIÓN: Si habla español, tiene a baptiste disposición servicios gratuitos de asistencia lingüística. Llame al [phone].    We comply with applicable federal civil rights laws and [place] laws. We do not discriminate on the basis of race, color, national origin, age, disability, sex, sexual orientation, or gender identity.            Thank you!     Thank you for choosing [facility]  for your care. Our goal is always to provide you with excellent care. Hearing back from our patients is one way we can continue to improve our services. Please take a few minutes to complete the written survey that you may receive in the mail after your visit with us. Thank you!             Your Updated Medication List - Protect others around you: Learn how to safely use, store and throw away your medicines at www.disposemymeds.org.          This list is accurate as of 2/7/18  4:02 PM.  Always use your most recent med list.                   Brand Name Dispense Instructions for use Diagnosis    levothyroxine 88 MCG tablet    SYNTHROID/LEVOTHROID    90 tablet    TAKE ONE TABLET BY MOUTH ONCE DAILY    Hypothyroidism       * nicotine 14 MG/24HR 24 hr patch    NICODERM CQ    30 patch     Place 1 patch onto the skin every 24 hours    Tobacco use disorder       * nicotine 7 MG/24HR 24 hr patch    NICODERM CQ    30 patch    Place 1 patch onto the skin every 24 hours    Tobacco use disorder       ONE DAILY WOMENS 50+ PO      Take 1 tablet by mouth every evening        * Notice:  This list has 2 medication(s) that are the same as other medications prescribed for you. Read the directions carefully, and ask your doctor or other care provider to review them with you.

## 2018-02-07 NOTE — ED PROVIDER NOTES
HPI  Patient is a 57-year-old female presenting with shortness of breath and lightheadedness.  She has a known history of hypothyroidism and multiple surgeries.  Other past medical history is reviewed.  Medications reviewed.  She smokes.  Rare alcohol, none recently.  No illegal drug use.  She was seen in the clinic just prior to arrival.    Patient presents with shortness of breath, lightheadedness, and numbness and tingling.  She does report having similar symptoms in the past though not to the severity.  Onset of symptoms was about noon today.  She describes feeling lightheaded and like she cannot get a full breath.  She denies any episodes of chest pain or back pain.  She has had lightheadedness throughout the day.  She has felt numbness and tingling of her upper and lower extremities, bilaterally.  She has had paresthesia around her mouth.  She has felt anxious but denies obviously hyperventilating.  Her symptoms worsened as she sat in the clinic waiting for treatment.  She was concerned about high blood pressure causing her symptoms and this is why she went to the clinic.  She denies recent medication changes.  She smokes.  She drank alcohol on Saturday, 4 days ago, but none since.  No illegal drug.    ROS: All other review of systems are negative other than that noted above.     Past Medical History:   Diagnosis Date     Diagnostic skin and sensitization tests 12/2/15 skin tests pos. for:  DM/G only.      Thyroid disease      Past Surgical History:   Procedure Laterality Date     ARTHROSCOPY ANKLE Right 6/25/2015    Procedure: ARTHROSCOPY ANKLE;  Surgeon: Riley Hilton DPM;  Location: [facility]     C THYROID ABLATION  1999     HYSTERECTOMY VAGINAL      heavy menses     OPEN REDUCTION INTERNAL FIXATION ANKLE  4/23/2014    Procedure: OPEN REDUCTION INTERNAL FIXATION ANKLE;  Surgeon: Riley Hilton DPM;  Location: [facility]     REMOVE HARDWARE ANKLE Right 6/25/2015    Procedure: REMOVE HARDWARE ANKLE;   "Surgeon: Riley Hilton DPM;  Location: [facility]     REPAIR CLEFT PALATE INFANT  age 6 months     Family History   Problem Relation Age of Onset     DIABETES Maternal Grandfather      Social History   Substance Use Topics     Smoking status: Current Every Day Smoker     Packs/day: 0.40     Years: 35.00     Types: Cigarettes     Smokeless tobacco: Never Used      Comment: Currently using patches - trying to quit     Alcohol use 0.0 oz/week     0 Standard drinks or equivalent per week      Comment: rare         PHYSICAL  /86  Pulse 74  Temp 97.9  F (36.6  C) (Oral)  Resp 11  Ht 1.626 m (5' 4\")  Wt 61.2 kg (135 lb)  SpO2 95%  BMI 23.17 kg/m2  General: Patient is alert and in moderate to severe distress.  Her eyes are closed throughout the interview.  She lies flat with her head slightly an incline.  Cooperative, conversational.  Neurological: Alert.  Moving upper and lower extremities equally, bilaterally.  Head / Neck: Atraumatic.  Ears: Not done.  Eyes: Pupils are equal, round, and reactive.  Normal conjunctiva.  Nose: Midline.  No epistaxis.  Mouth / Throat: No ulcerations or lesions.  Upper pharynx is not erythematous.  Moist.  Respiratory: No respiratory distress. CTA B.  Cardiovascular: Regular rhythm.  Peripheral extremities are warm.  No edema.  No calf tenderness.  Abdomen / Pelvis: Not tender.  No distention.  Soft throughout.  Genitalia: Not done.  Musculoskeletal: No tenderness over major muscles and joints.  Skin: No evidence of rash or trauma.        ED COURSE  1705.  The patient seems to be experiencing symptoms consistent with hyperventilation.  She does appear anxious at the moment.  Ativan is ordered.  Broad work up also ordered and pending.  EKG is unremarkable and documented below.    Labs Ordered and Resulted from Time of ED Arrival Up to the Time of Departure from the ED   CBC WITH PLATELETS DIFFERENTIAL - Abnormal; Notable for the following:        Result Value    RBC Count 5.35 " (*)     Hemoglobin 16.1 (*)     All other components within normal limits   BASIC METABOLIC PANEL   TROPONIN I   TSH WITH FREE T4 REFLEX       EKG  (1645)   Interpretation performed by me.  Rate: 68     Rhythm: sinus     Axis: nl  Intervals: OR (12-2) 148, QRS (<12) 88, QTc (>5) 408  P wave: nl     QRS complex: nl  ST segment / T-wave: nl  Conclusion: nl    IMAGING  Images reviewed by me.  Radiology report also reviewed.  XR Chest 2 Views   Preliminary Result   IMPRESSION: No acute abnormality.        1815.  Patient's workup here is unremarkable.  Low concern for acute coronary syndrome.  The patient is much more comfortable and symptoms have resolved after the Ativan.  Her blood pressure has dropped nicely and is currently within normal range.  I did recommend she continue to check this in the morning as soon as she wakes up and then document the results and follow-up with her primary for further discussion.  No emergent need for hospitalization.  Patient is comfortable being discharged home.      IMPRESSION    ICD-10-CM    1. Hyperventilation R06.4            Critical Care time:  none                    Omar Castañeda MD  02/07/18 7347

## 2018-02-08 VITALS — SYSTOLIC BLOOD PRESSURE: 170 MMHG | OXYGEN SATURATION: 97 % | DIASTOLIC BLOOD PRESSURE: 98 MMHG | HEART RATE: 84 BPM

## 2018-02-08 NOTE — DISCHARGE INSTRUCTIONS
Return to the Emergency Room if the following occurs:     New chest pain, worsened breathing, fainting, or for any concern at anytime.    Or, follow-up with the following provider as we discussed:     Return to your primary doctor this week or next for reevaluation.    Medications discussed:    None new.  No changes.    If you received pain-relieving or sedating medication during your time in the ER, avoid alcohol, driving automobiles, or working with machinery.  Also, a responsible adult must stay with you.        Call the Nurse Advice Line at [phone] or [phone] for any concern at anytime.

## 2018-02-12 ENCOUNTER — OFFICE VISIT (OUTPATIENT)
Dept: FAMILY MEDICINE | Facility: CLINIC | Age: 58
End: 2018-02-12
Payer: COMMERCIAL

## 2018-02-12 VITALS
WEIGHT: 135 LBS | HEART RATE: 69 BPM | HEIGHT: 65 IN | DIASTOLIC BLOOD PRESSURE: 90 MMHG | OXYGEN SATURATION: 98 % | SYSTOLIC BLOOD PRESSURE: 162 MMHG | BODY MASS INDEX: 22.49 KG/M2 | RESPIRATION RATE: 18 BRPM | TEMPERATURE: 97.7 F

## 2018-02-12 DIAGNOSIS — G47.00 INSOMNIA, UNSPECIFIED TYPE: ICD-10-CM

## 2018-02-12 DIAGNOSIS — F41.9 ANXIETY: Primary | ICD-10-CM

## 2018-02-12 DIAGNOSIS — I10 ESSENTIAL HYPERTENSION: ICD-10-CM

## 2018-02-12 PROCEDURE — 99214 OFFICE O/P EST MOD 30 MIN: CPT | Performed by: FAMILY MEDICINE

## 2018-02-12 RX ORDER — METOPROLOL SUCCINATE 50 MG/1
50 TABLET, EXTENDED RELEASE ORAL DAILY
Qty: 30 TABLET | Refills: 3 | Status: SHIPPED | OUTPATIENT
Start: 2018-02-12 | End: 2018-07-08

## 2018-02-12 RX ORDER — SERTRALINE HYDROCHLORIDE 100 MG/1
100 TABLET, FILM COATED ORAL DAILY
Qty: 30 TABLET | Refills: 3 | Status: SHIPPED | OUTPATIENT
Start: 2018-02-12 | End: 2018-04-12 | Stop reason: SINTOL

## 2018-02-12 RX ORDER — TRAZODONE HYDROCHLORIDE 50 MG/1
50 TABLET, FILM COATED ORAL
Qty: 30 TABLET | Refills: 3 | Status: SHIPPED | OUTPATIENT
Start: 2018-02-12 | End: 2018-09-05

## 2018-02-12 ASSESSMENT — ANXIETY QUESTIONNAIRES
5. BEING SO RESTLESS THAT IT IS HARD TO SIT STILL: MORE THAN HALF THE DAYS
3. WORRYING TOO MUCH ABOUT DIFFERENT THINGS: NEARLY EVERY DAY
6. BECOMING EASILY ANNOYED OR IRRITABLE: SEVERAL DAYS
7. FEELING AFRAID AS IF SOMETHING AWFUL MIGHT HAPPEN: NEARLY EVERY DAY
GAD7 TOTAL SCORE: 17
1. FEELING NERVOUS, ANXIOUS, OR ON EDGE: MORE THAN HALF THE DAYS
2. NOT BEING ABLE TO STOP OR CONTROL WORRYING: NEARLY EVERY DAY

## 2018-02-12 ASSESSMENT — PATIENT HEALTH QUESTIONNAIRE - PHQ9: 5. POOR APPETITE OR OVEREATING: NEARLY EVERY DAY

## 2018-02-12 NOTE — NURSING NOTE
"Chief Complaint   Patient presents with     Hypertension     Anxiety       Initial Breastfeeding? No Estimated body mass index is 23.17 kg/(m^2) as calculated from the following:    Height as of 2/7/18: 5' 4\" (1.626 m).    Weight as of 2/7/18: 135 lb (61.2 kg).      Health Maintenance that is potentially due pending provider review:  Mammogram, PHQ9 and GAD7    Possibly completing today per provider review.    Is there anyone who you would like to be able to receive your results? No  If yes have patient fill out MONICA    "
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
## 2018-02-12 NOTE — MR AVS SNAPSHOT
"              After Visit Summary   2018    Katharine Harmon    MRN: [id number]           Patient Information     Date Of Birth          1960        Visit Information        Provider Department      2018 1:40 PM Chicho Koroma MD Formerly named [facility]        Today's Diagnoses     Anxiety    -  1    Essential hypertension        Insomnia, unspecified type           Follow-ups after your visit        Who to contact     If you have questions or need follow up information about today's clinic visit or your schedule please contact [facility] directly at [phone].  Normal or non-critical lab and imaging results will be communicated to you by mPATHhart, letter or phone within 4 business days after the clinic has received the results. If you do not hear from us within 7 days, please contact the clinic through mPATHhart or phone. If you have a critical or abnormal lab result, we will notify you by phone as soon as possible.  Submit refill requests through Retsly or call your pharmacy and they will forward the refill request to us. Please allow 3 business days for your refill to be completed.          Additional Information About Your Visit        MyChart Information     Retsly lets you send messages to your doctor, view your test results, renew your prescriptions, schedule appointments and more. To sign up, go to [email or web address] . Click on \"Log in\" on the left side of the screen, which will take you to the Welcome page. Then click on \"Sign up Now\" on the right side of the page.     You will be asked to enter the access code listed below, as well as some personal information. Please follow the directions to create your username and password.     Your access code is: [email or web address]  Expires: 2018  3:02 PM     Your access code will  in 90 days. If you need help or a new code, please call your [facility] or [phone].        Care EveryWhere ID     This is your Care " "EveryWhere ID. This could be used by other organizations to access your [place] medical records  [id number]        Your Vitals Were     Pulse Temperature Respirations Height Pulse Oximetry Breastfeeding?    69 97.7  F (36.5  C) (Tympanic) 18 5' 4.75\" (1.645 m) 98% No    BMI (Body Mass Index)                   22.64 kg/m2            Blood Pressure from Last 3 Encounters:   02/12/18 162/90   02/07/18 125/86   02/07/18 (!) 170/98    Weight from Last 3 Encounters:   02/12/18 135 lb (61.2 kg)   02/07/18 135 lb (61.2 kg)   04/06/17 139 lb (63 kg)              Today, you had the following     No orders found for display         Today's Medication Changes          These changes are accurate as of 2/12/18  2:27 PM.  If you have any questions, ask your nurse or doctor.               Start taking these medicines.        Dose/Directions    metoprolol succinate 50 MG 24 hr tablet   Commonly known as:  TOPROL-XL   Used for:  Essential hypertension   Started by:  Chicho Koroma MD        Dose:  50 mg   Take 1 tablet (50 mg) by mouth daily   Quantity:  30 tablet   Refills:  3       sertraline 100 MG tablet   Commonly known as:  ZOLOFT   Used for:  Anxiety   Started by:  Chicho Koroma MD        Dose:  100 mg   Take 1 tablet (100 mg) by mouth daily   Quantity:  30 tablet   Refills:  3       traZODone 50 MG tablet   Commonly known as:  DESYREL   Used for:  Insomnia, unspecified type   Started by:  Chicho Koroma MD        Dose:  50 mg   Take 1 tablet (50 mg) by mouth nightly as needed for sleep   Quantity:  30 tablet   Refills:  3            Where to get your medicines      These medications were sent to [facility] [address] [place] [address] [address] [place] [address]  [address] [facility] [address]     Phone:  [phone]     metoprolol succinate 50 MG 24 hr tablet    sertraline 100 MG tablet    traZODone 50 MG tablet                Primary Care Provider Office Phone # Fax #    Chicho Koroma [phone]       " [address]  [facility] [phone]        Equal Access to Services     ARACELYAD MAURA : Hadii aad ku hadmalenavirginia Socynthia, waenriqueda luafiaadaha, qacollinta idaniatitosusan clarkrasusan, harmony gunterignaciosaroj rosenberg. So [facility] [phone].    ATENCIÓN: Si habla español, tiene a baptiste disposición servicios gratuitos de asistencia lingüística. Llame al [phone].    We comply with applicable federal civil rights laws and [place] laws. We do not discriminate on the basis of race, color, national origin, age, disability, sex, sexual orientation, or gender identity.            Thank you!     Thank you for choosing [facility]  for your care. Our goal is always to provide you with excellent care. Hearing back from our patients is one way we can continue to improve our services. Please take a few minutes to complete the written survey that you may receive in the mail after your visit with us. Thank you!             Your Updated Medication List - Protect others around you: Learn how to safely use, store and throw away your medicines at www.disposemymeds.org.          This list is accurate as of 2/12/18  2:27 PM.  Always use your most recent med list.                   Brand Name Dispense Instructions for use Diagnosis    levothyroxine 88 MCG tablet    SYNTHROID/LEVOTHROID    90 tablet    TAKE ONE TABLET BY MOUTH ONCE DAILY    Hypothyroidism       metoprolol succinate 50 MG 24 hr tablet    TOPROL-XL    30 tablet    Take 1 tablet (50 mg) by mouth daily    Essential hypertension       ONE DAILY WOMENS 50+ PO      Take 1 tablet by mouth every evening        sertraline 100 MG tablet    ZOLOFT    30 tablet    Take 1 tablet (100 mg) by mouth daily    Anxiety       traZODone 50 MG tablet    DESYREL    30 tablet    Take 1 tablet (50 mg) by mouth nightly as needed for sleep    Insomnia, unspecified type

## 2018-02-13 ASSESSMENT — ANXIETY QUESTIONNAIRES: GAD7 TOTAL SCORE: 17

## 2018-02-13 ASSESSMENT — PATIENT HEALTH QUESTIONNAIRE - PHQ9: SUM OF ALL RESPONSES TO PHQ QUESTIONS 1-9: 15

## 2018-04-04 ENCOUNTER — FCC EXTENDED DOCUMENTATION (OUTPATIENT)
Dept: PSYCHOLOGY | Facility: CLINIC | Age: 58
End: 2018-04-04

## 2018-04-04 NOTE — PROGRESS NOTES
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    WHODAS 2.0 (12 item)               This questionnaire asks about difficulties due to health conditions. Health conditions             include disease or illnesses, other health problems that may be short or long lasting,             injuries, mental health or emotional problems, and problems with alcohol or drugs.                   Think back over the past 30 days and answer these questions, thinking about how much             difficulty you had doing the following activities. For each question, please [place] only             one response.      S1  Standing for long periods such as 30 minutes?  Moderate =   3    S2  Taking care of household responsibilities?  Moderate =   3    S3  Learning a new task, for example, learning how to get to a new place?  Mild =           2    S4  How much of a problem do you have joining community activities (for example, festivals, Muslim or other activities) in the same way as anyone else can?  Mild =           2    S5  How much have you been emotionally affected by your health problems?  Moderate =   3             In the past 30 days, how much difficulty did you have in:    S6  Concentrating on doing something for ten minutes?  Mild =           2    S7  Walking a long distance such as a kilometer (or equivalent)?  Moderate =   3    S8  Washing your whole body?  None =         1    S9  Getting dressed?  None =         1    S10  Dealing with people you do not know?  Mild =           2    S11  Maintaining a friendship?  Moderate =   3    S12  Your day to day work?  Moderate =   3         H1  Overall, in the past 30 days, how many days were these difficulties present?  Record number of days 25    H2  In the past 30 days, for how many days were you totally unable to carry out your usual activities or work because of any health condition?  Record number of days  25    H3  In the past 30 days, not counting the days that you were totally unable, for how many days did you cut back or reduce your usual activities or work because of any health condition?  Record number of days 10                           Presenting Concern:  Stress and depression      Reason for Discharge:  Client did not return      Disposition at Time of Last Encounter:   Comments:   Client completed diagnostic assessment      Risk Management:   Client has had a history of Suicidal thoughts years ago  A safety and risk management plan has not been developed at this time, however client was given the after-hours number / 911 should there be a change in any of these risk factors.      Referred To:  Does not apply         Glendy De La Cruz,    4/4/2018

## 2018-04-12 ENCOUNTER — OFFICE VISIT (OUTPATIENT)
Dept: FAMILY MEDICINE | Facility: CLINIC | Age: 58
End: 2018-04-12
Payer: COMMERCIAL

## 2018-04-12 VITALS
RESPIRATION RATE: 18 BRPM | HEIGHT: 65 IN | HEART RATE: 54 BPM | SYSTOLIC BLOOD PRESSURE: 152 MMHG | WEIGHT: 134.8 LBS | OXYGEN SATURATION: 99 % | BODY MASS INDEX: 22.46 KG/M2 | TEMPERATURE: 97.1 F | DIASTOLIC BLOOD PRESSURE: 98 MMHG

## 2018-04-12 DIAGNOSIS — F41.9 ANXIETY: Primary | ICD-10-CM

## 2018-04-12 DIAGNOSIS — I10 ESSENTIAL HYPERTENSION: ICD-10-CM

## 2018-04-12 DIAGNOSIS — F51.01 PRIMARY INSOMNIA: ICD-10-CM

## 2018-04-12 DIAGNOSIS — M53.3 PAIN IN THE COCCYX: ICD-10-CM

## 2018-04-12 PROCEDURE — 99214 OFFICE O/P EST MOD 30 MIN: CPT | Performed by: FAMILY MEDICINE

## 2018-04-12 RX ORDER — CHOLECALCIFEROL (VITAMIN D3) 25 MCG
2 CAPSULE ORAL DAILY
COMMUNITY

## 2018-04-12 RX ORDER — HYDROCHLOROTHIAZIDE 12.5 MG/1
12.5 TABLET ORAL DAILY
Qty: 30 TABLET | Refills: 2 | Status: SHIPPED | OUTPATIENT
Start: 2018-04-12 | End: 2018-07-08

## 2018-04-12 RX ORDER — ESCITALOPRAM OXALATE 5 MG/1
5 TABLET ORAL DAILY
Qty: 30 TABLET | Refills: 2 | Status: SHIPPED | OUTPATIENT
Start: 2018-04-12 | End: 2018-09-05

## 2018-04-12 ASSESSMENT — PAIN SCALES - GENERAL: PAINLEVEL: EXTREME PAIN (8)

## 2018-04-12 NOTE — NURSING NOTE
"Chief Complaint   Patient presents with     Stress     Hypertension       Initial Breastfeeding? No Estimated body mass index is 22.64 kg/(m^2) as calculated from the following:    Height as of 2/12/18: 5' 4.75\" (1.645 m).    Weight as of 2/12/18: 135 lb (61.2 kg).      Health Maintenance that is potentially due pending provider review:  Mammogram    Gave pt phone number/pended order to schedule mammo and/or colonoscopy(or FIT)      "

## 2018-04-12 NOTE — PROGRESS NOTES
"  SUBJECTIVE:   Katharine Harmon is a 57 year old female who presents to clinic today for the following health issues:  Concern - fall, tail bone pain  Onset: 1.5 weeks ago    Description:   Fall on tailbone when slipped on ice    Intensity: 8/10    Progression of Symptoms:  same    Accompanying Signs & Symptoms:  constipation    Previous history of similar problem:   Yes fracture with each child birth    Precipitating factors:   Worsened by: sitting    Alleviating factors:  Improved by: ice    Therapies Tried and outcome: ice is some help    Concern - additional stress in life with HTN  Onset: Jan. worse    Description:   Htn, and added stress in life    Intensity: 191/107 at dentist at [facility] and couldn't even do procedure    At home 146/105    Progression of Symptoms:  worsening    Accompanying Signs & Symptoms:  dizziness    Previous history of similar problem:   Yes taking medication regular    Precipitating factors:   Worsened by: 0    Alleviating factors:  Improved by: 0    Therapies Tried and outcome: resting has not helped     ;Katharine Harmon is a 57 year old female with tailbone pain.  meds to try?  Improving slowly after fall  Gait fine    Htn: not controlled.  Willing to take more meds  Anxiety: not controlled.  Didn't tolerate zoloft  Insomnia: not controlled.  50mg trazodone made her feel like a \"zombie\" the next day    Problem list, med list, additional histories reviewed and updated, as indicated.      No cp or sob    O:BP (!) 152/98  Pulse 54  Temp 97.1  F (36.2  C) (Tympanic)  Resp 18  Ht 5' 4.5\" (1.638 m)  Wt 134 lb 12.8 oz (61.1 kg)  SpO2 99%  Breastfeeding? No  BMI 22.78 kg/m2  GEN: Alert and oriented, in no acute distress  Walking OK    A: Htn: not controlled.    Anxiety: not controlled.    Insomnia: not controlled.  Tailbone pain, new    P: discussed tylenol for pain, she agrees    Add hctz, low dose  Add lexapro, low dose  Try cutting trazodone in half prn    Sensitive to meds, need to " make adjustments cautiously and slowly.  She agrees.     F/u in 1-2 mo.

## 2018-04-12 NOTE — MR AVS SNAPSHOT
"              After Visit Summary   2018    Katharine Harmon    MRN: [id number]           Patient Information     Date Of Birth          1960        Visit Information        Provider Department      2018 10:40 AM Chicho Koroma MD Formerly named [facility]        Today's Diagnoses     Anxiety    -  1    Essential hypertension           Follow-ups after your visit        Who to contact     If you have questions or need follow up information about today's clinic visit or your schedule please contact [facility] directly at [phone].  Normal or non-critical lab and imaging results will be communicated to you by MyChart, letter or phone within 4 business days after the clinic has received the results. If you do not hear from us within 7 days, please contact the clinic through McPhyhart or phone. If you have a critical or abnormal lab result, we will notify you by phone as soon as possible.  Submit refill requests through Hexoskin ([employer]) or call your pharmacy and they will forward the refill request to us. Please allow 3 business days for your refill to be completed.          Additional Information About Your Visit        MyChart Information     [employer] lets you send messages to your doctor, view your test results, renew your prescriptions, schedule appointments and more. To sign up, go to [email or web address] ([employer]) . Click on \"Log in\" on the left side of the screen, which will take you to the Welcome page. Then click on \"Sign up Now\" on the right side of the page.     You will be asked to enter the access code listed below, as well as some personal information. Please follow the directions to create your username and password.     Your access code is: [email or web address]  Expires: 2018 11:30 AM     Your access code will  in 90 days. If you need help or a new code, please call your [facility] or [phone].        Care EveryWhere ID     This is your Care EveryWhere ID. This could be used " "by other organizations to access your [place] medical records  [id number]        Your Vitals Were     Pulse Temperature Respirations Height Pulse Oximetry Breastfeeding?    54 97.1  F (36.2  C) (Tympanic) 18 5' 4.5\" (1.638 m) 99% No    BMI (Body Mass Index)                   22.78 kg/m2            Blood Pressure from Last 3 Encounters:   04/12/18 (!) 152/98   02/12/18 162/90   02/07/18 125/86    Weight from Last 3 Encounters:   04/12/18 134 lb 12.8 oz (61.1 kg)   02/12/18 135 lb (61.2 kg)   02/07/18 135 lb (61.2 kg)              Today, you had the following     No orders found for display         Today's Medication Changes          These changes are accurate as of 4/12/18 11:30 AM.  If you have any questions, ask your nurse or doctor.               Start taking these medicines.        Dose/Directions    escitalopram 5 MG tablet   Commonly known as:  LEXAPRO   Used for:  Anxiety   Started by:  Chicho Koroma MD        Dose:  5 mg   Take 1 tablet (5 mg) by mouth daily   Quantity:  30 tablet   Refills:  2       hydrochlorothiazide 12.5 MG Tabs tablet   Used for:  Essential hypertension   Started by:  Chicho Koroma MD        Dose:  12.5 mg   Take 1 tablet (12.5 mg) by mouth daily   Quantity:  30 tablet   Refills:  2         Stop taking these medicines if you haven't already. Please contact your care team if you have questions.     sertraline 100 MG tablet   Commonly known as:  ZOLOFT   Stopped by:  Chicho Koroma MD                Where to get your medicines      These [facility] to [facility] [address] [place] [address] [address] [facility] [facility]  [address] [facility] [address]     Phone:  [phone]     escitalopram 5 MG tablet    hydrochlorothiazide 12.5 MG Tabs tablet                Primary Care Provider Office Phone # Fax #    Chicho Koroma [phone]       [address] [place] [phone]        Equal Access to Services     MINAL LORENZO AH: Micaela barreto " Conchita, waenriqueda luqadaha, qaybta kaandi rm, harmony clark lyricradha xiongantoine chet. So [facility] [phone].    ATENCIÓN: Si ebony james, tiene a baptiste disposición servicios gratuitos de asistencia lingüística. Katiana al [phone].    We comply with applicable federal civil rights laws and [place] laws. We do not discriminate on the basis of race, color, national origin, age, disability, sex, sexual orientation, or gender identity.            Thank you!     Thank you for choosing [facility]  for your care. Our goal is always to provide you with excellent care. Hearing back from our patients is one way we can continue to improve our services. Please take a few minutes to complete the written survey that you may receive in the mail after your visit with us. Thank you!             Your Updated Medication List - Protect others around you: Learn how to safely use, store and throw away your medicines at www.disposemymeds.org.          This list is accurate as of 4/12/18 11:30 AM.  Always use your most recent med list.                   Brand Name Dispense Instructions for use Diagnosis    escitalopram 5 MG tablet    LEXAPRO    30 tablet    Take 1 tablet (5 mg) by mouth daily    Anxiety       hydrochlorothiazide 12.5 MG Tabs tablet     30 tablet    Take 1 tablet (12.5 mg) by mouth daily    Essential hypertension       levothyroxine 88 MCG tablet    SYNTHROID/LEVOTHROID    90 tablet    TAKE ONE TABLET BY MOUTH ONCE DAILY    Hypothyroidism       metoprolol succinate 50 MG 24 hr tablet    TOPROL-XL    30 tablet    Take 1 tablet (50 mg) by mouth daily    Essential hypertension       ONE DAILY WOMENS 50+ PO      Take 1 tablet by mouth every evening        traZODone 50 MG tablet    DESYREL    30 tablet    Take 1 tablet (50 mg) by mouth nightly as needed for sleep    Insomnia, unspecified type       Vitamin D-3 1000 units Caps      Take 2 capsules by mouth daily

## 2018-05-07 ENCOUNTER — HEALTH MAINTENANCE LETTER (OUTPATIENT)
Age: 58
End: 2018-05-07

## 2018-05-21 DIAGNOSIS — E03.9 HYPOTHYROIDISM: ICD-10-CM

## 2018-05-21 RX ORDER — LEVOTHYROXINE SODIUM 88 UG/1
TABLET ORAL
Qty: 90 TABLET | Refills: 2 | Status: SHIPPED | OUTPATIENT
Start: 2018-05-21 | End: 2018-11-14

## 2018-05-21 NOTE — TELEPHONE ENCOUNTER
Prescription approved per [facility] Refill Protocol.  Merna HERMAN RN

## 2018-05-21 NOTE — TELEPHONE ENCOUNTER
"Requested Prescriptions   Pending Prescriptions Disp Refills     levothyroxine (SYNTHROID/LEVOTHROID) 88 MCG tablet [Pharmacy Med Name: LEVOTHYROXIN 88MCG  TAB] 90 tablet 1     Sig: TAKE ONE TABLET BY MOUTH ONCE DAILY    Thyroid Protocol Passed    5/21/2018  5:31 AM       Passed - Patient is 12 years or older       Passed - Recent (12 mo) or future (30 days) visit within the authorizing provider's specialty    Patient had office visit in the last 12 months or has a visit in the next 30 days with authorizing provider or within the authorizing provider's specialty.  See \"Patient Info\" tab in inbasket, or \"Choose Columns\" in Meds & Orders section of the refill encounter.           Passed - Normal TSH on file in past 12 months    Recent Labs   Lab Test  02/07/18   1645   TSH  2.12             Passed - No active pregnancy on record    If patient is pregnant or has had a positive pregnancy test, please check TSH.         Passed - No positive pregnancy test in past 12 months    If patient is pregnant or has had a positive pregnancy test, please check TSH.          levothyroxine (SYNTHROID/LEVOTHROID) 88 MCG tablet  Last Written Prescription Date:  11/24/2017  Last Fill Quantity: 90 tablet,  # refills: 1   Last office visit: 4/12/2018 with prescribing provider:  REBECCA Koroma   Future Office Visit:      Sunita MONTOYA (R) (M)    "

## 2018-07-08 DIAGNOSIS — I10 ESSENTIAL HYPERTENSION: ICD-10-CM

## 2018-07-08 NOTE — TELEPHONE ENCOUNTER
"Requested Prescriptions   Pending Prescriptions Disp Refills     hydrochlorothiazide 12.5 MG TABS tablet   Last Written Prescription Date:  4/12/18  Last Fill Quantity: 30,  # refills: 2   Last office visit: 4/12/2018 with prescribing provider:  PUJA Koroma   Future Office Visit:     30 tablet 2     Sig: TAKE 1 TABLET BY MOUTH ONCE DAILY    Diuretics (Including Combos) Protocol Failed    7/8/2018 11:42 AM       Failed - Blood pressure under 140/90 in past 12 months    BP Readings from Last 3 Encounters:   04/12/18 (!) 152/98   02/12/18 162/90   02/07/18 125/86                Passed - Recent (12 mo) or future (30 days) visit within the authorizing provider's specialty    Patient had office visit in the last 12 months or has a visit in the next 30 days with authorizing provider or within the authorizing provider's specialty.  See \"Patient Info\" tab in inbasket, or \"Choose Columns\" in Meds & Orders section of the refill encounter.           Passed - Patient is age 18 or older       Passed - No active pregancy on record       Passed - Normal serum creatinine on file in past 12 months    Recent Labs   Lab Test  02/07/18   1645   CR  0.72             Passed - Normal serum potassium on file in past 12 months    Recent Labs   Lab Test  02/07/18   1645   POTASSIUM  3.5                   Passed - Normal serum sodium on file in past 12 months    Recent Labs   Lab Test  02/07/18   1645   NA  140             Passed - No positive pregnancy test in past 12 months        metoprolol succinate (TOPROL-XL) 50 MG 24 hr tablet   Last Written Prescription Date:  2/12/18  Last Fill Quantity: 30,  # refills: 3   Last office visit: 4/12/2018 with prescribing provider:  PUJA Koroma   Future Office Visit:     30 tablet 3     Sig: TAKE 1 TABLET BY MOUTH ONCE DAILY    Beta-Blockers Protocol Failed    7/8/2018 11:42 AM       Failed - Blood pressure under 140/90 in past 12 months    BP Readings from Last 3 Encounters:   04/12/18 (!) 152/98 " "  02/12/18 162/90   02/07/18 125/86                Passed - Patient is age 6 or older       Passed - Recent (12 mo) or future (30 days) visit within the authorizing provider's specialty    Patient had office visit in the last 12 months or has a visit in the next 30 days with authorizing provider or within the authorizing provider's specialty.  See \"Patient Info\" tab in inbasket, or \"Choose Columns\" in Meds & Orders section of the refill encounter.              "

## 2018-07-09 RX ORDER — HYDROCHLOROTHIAZIDE 12.5 MG/1
12.5 TABLET ORAL DAILY
Qty: 90 TABLET | Refills: 0 | Status: SHIPPED | OUTPATIENT
Start: 2018-07-09 | End: 2018-09-05

## 2018-07-09 RX ORDER — METOPROLOL SUCCINATE 50 MG/1
50 TABLET, EXTENDED RELEASE ORAL DAILY
Qty: 90 TABLET | Refills: 3 | Status: SHIPPED | OUTPATIENT
Start: 2018-07-09 | End: 2018-11-23

## 2018-09-05 ENCOUNTER — OFFICE VISIT (OUTPATIENT)
Dept: FAMILY MEDICINE | Facility: CLINIC | Age: 58
End: 2018-09-05
Payer: COMMERCIAL

## 2018-09-05 VITALS
TEMPERATURE: 97.5 F | RESPIRATION RATE: 16 BRPM | DIASTOLIC BLOOD PRESSURE: 96 MMHG | SYSTOLIC BLOOD PRESSURE: 144 MMHG | WEIGHT: 118.4 LBS | HEART RATE: 68 BPM | BODY MASS INDEX: 20.01 KG/M2

## 2018-09-05 DIAGNOSIS — I10 ESSENTIAL HYPERTENSION: ICD-10-CM

## 2018-09-05 DIAGNOSIS — E03.9 HYPOTHYROIDISM, UNSPECIFIED TYPE: Primary | Chronic | ICD-10-CM

## 2018-09-05 DIAGNOSIS — Z12.31 ENCOUNTER FOR SCREENING MAMMOGRAM FOR BREAST CANCER: ICD-10-CM

## 2018-09-05 DIAGNOSIS — Z11.4 SCREENING FOR HIV (HUMAN IMMUNODEFICIENCY VIRUS): ICD-10-CM

## 2018-09-05 DIAGNOSIS — Z11.59 ENCOUNTER FOR HEPATITIS C SCREENING TEST FOR LOW RISK PATIENT: ICD-10-CM

## 2018-09-05 LAB
T4 FREE SERPL-MCNC: 1.35 NG/DL (ref 0.76–1.46)
TSH SERPL DL<=0.005 MIU/L-ACNC: 0.15 MU/L (ref 0.4–4)

## 2018-09-05 PROCEDURE — 84439 ASSAY OF FREE THYROXINE: CPT | Performed by: FAMILY MEDICINE

## 2018-09-05 PROCEDURE — 86803 HEPATITIS C AB TEST: CPT | Performed by: FAMILY MEDICINE

## 2018-09-05 PROCEDURE — 99214 OFFICE O/P EST MOD 30 MIN: CPT | Performed by: FAMILY MEDICINE

## 2018-09-05 PROCEDURE — 36415 COLL VENOUS BLD VENIPUNCTURE: CPT | Performed by: FAMILY MEDICINE

## 2018-09-05 PROCEDURE — 84443 ASSAY THYROID STIM HORMONE: CPT | Performed by: FAMILY MEDICINE

## 2018-09-05 PROCEDURE — 87389 HIV-1 AG W/HIV-1&-2 AB AG IA: CPT | Performed by: FAMILY MEDICINE

## 2018-09-05 RX ORDER — HYDROCHLOROTHIAZIDE 12.5 MG/1
12.5 TABLET ORAL DAILY
Qty: 90 TABLET | Refills: 3 | Status: SHIPPED | OUTPATIENT
Start: 2018-09-05 | End: 2019-09-18

## 2018-09-05 ASSESSMENT — PAIN SCALES - GENERAL: PAINLEVEL: NO PAIN (0)

## 2018-09-05 ASSESSMENT — ANXIETY QUESTIONNAIRES
GAD7 TOTAL SCORE: 2
GAD7 TOTAL SCORE: 2
7. FEELING AFRAID AS IF SOMETHING AWFUL MIGHT HAPPEN: SEVERAL DAYS
7. FEELING AFRAID AS IF SOMETHING AWFUL MIGHT HAPPEN: SEVERAL DAYS
5. BEING SO RESTLESS THAT IT IS HARD TO SIT STILL: NOT AT ALL
6. BECOMING EASILY ANNOYED OR IRRITABLE: NOT AT ALL
1. FEELING NERVOUS, ANXIOUS, OR ON EDGE: NOT AT ALL
GAD7 TOTAL SCORE: 2
3. WORRYING TOO MUCH ABOUT DIFFERENT THINGS: NOT AT ALL
2. NOT BEING ABLE TO STOP OR CONTROL WORRYING: NOT AT ALL
4. TROUBLE RELAXING: SEVERAL DAYS

## 2018-09-05 ASSESSMENT — PATIENT HEALTH QUESTIONNAIRE - PHQ9
SUM OF ALL RESPONSES TO PHQ QUESTIONS 1-9: 0
SUM OF ALL RESPONSES TO PHQ QUESTIONS 1-9: 0
10. IF YOU CHECKED OFF ANY PROBLEMS, HOW DIFFICULT HAVE THESE PROBLEMS MADE IT FOR YOU TO DO YOUR WORK, TAKE CARE OF THINGS AT HOME, OR GET ALONG WITH OTHER PEOPLE: NOT DIFFICULT AT ALL

## 2018-09-05 NOTE — PROGRESS NOTES
SUBJECTIVE:   Katharine Harmon is a 58 year old female who presents to clinic today for the following health issues:      Hypothyroidism Follow-up      Since last visit, patient describes the following symptoms: weight loss of 20 lbs since the beginning of the year, not intentional. Wondering if thyroid could be off. Does have some stress going on as well.     Phq-9 (Phq-9)-Developed By Veronica Zaman,Michelle Whitt,Kolton Davies And Colleagues,With An Educational Romeo From [employer] 2002.      Question    9/5/2018  2:04 PM CDT     Over the last 2 weeks, how often have you been bothered by any of the following problems?       1. Little interest or pleasure in doing things  Not at all     2.  Feeling down, depressed, or hopeless  Not at all     3.  Trouble falling or staying asleep, or sleeping too much  Not at all     4.  Feeling tired or having little energy  Not at all     5.  Poor appetite or overeating  Not at all     6.  Feeling bad about yourself - or that you are a failure or have let yourself or your family down  Not at all     7.  Trouble concentrating on things, such as reading the newspaper or watching television  Not at all     8.  Moving or speaking so slowly that other people could have noticed. Or the opposite - being so fidgety or restless that you have been moving around a lot more than usual  Not at all     9.  Thoughts that you would be better off dead, or of hurting yourself in some way  Not at all     If you checked off any problems, how difficult have these problems made it for you to do your work, take care of things at home, or get along with other people?  Not difficult at all     PHQ9 TOTAL SCORE (range: 0 - 27)  0       Evelio-7 ([employer] Inc,2002; Used With Permission)      Question    9/5/2018  2:05 PM CDT     Over the last two weeks, how often have you been bothered by the following problems?       1. Feeling nervous, anxious, or on edge  Not at all     2. Not being able to  stop or control worrying  Not at all     3. Worrying too much about different things  Not at all     4. Trouble relaxing  Several days     5. Being so restless that it is hard to sit still  Not at all     6. Becoming easily annoyed or irritable  Not at all     7. Feeling afraid, as if something awful might happen  Several days     CEFERINO 7 TOTAL SCORE (range: 0 - 21)  2 (minimal anxiety)               S: Katharine Harmon is a 58 year old female with thyroid issues in past.  Has lost some wt, not sure if intentional or not.  Says she stopped eating at night, which was always an isue for her, and now exercising regularly.  Also out of a stressful living environment    Says her weight before hurting her ankle was 115, that's where she is now    Htn: not at goal, but working on quitting smoking.  Wants to  Stay at current doses and see what happens when she quits    Tobacco use: motivated to quit.  Using the patch.  Thinks she's going to kick it.    Problem list, med list, additional histories reviewed and updated, as indicated.      No cp or sob  No blood in stool  Does mammograms and paps and colonoscopy    O:BP (!) 144/96 (BP Location: Right arm, Patient Position: Chair, Cuff Size: Adult Regular)  Pulse 68  Temp 97.5  F (36.4  C) (Tympanic)  Resp 16  Wt 118 lb 6.4 oz (53.7 kg)  BMI 20.01 kg/m2  GEN: Alert and oriented, in no acute distress  CV: RRR, no murmur  RESP: lungs clear bilaterally, good effort  EXT: no edema or lesions noted in lower extremities  Neck: neck supple without mass or lymphadenopathy    A: htn, better, but not at goal       Hypothyroid, stable       Tobacco use, ongoing    P: check thyroid.  Unclear if wt loss is intentional, may be.  If weight stablizes and thyroid is normal, then not  Needing further workup.  If wt goes down still further with normal thyroid, needs wt loss workup.  She agrees.  Shouldn't go much below 115 given her history of what she's weighed in her life.  .

## 2018-09-05 NOTE — NURSING NOTE
"Chief Complaint   Patient presents with     Thyroid Problem       Initial BP (!) 144/96 (BP Location: Right arm, Patient Position: Chair, Cuff Size: Adult Regular)  Pulse 68  Temp 97.5  F (36.4  C) (Tympanic)  Resp 16  Wt 118 lb 6.4 oz (53.7 kg)  BMI 20.01 kg/m2 Estimated body mass index is 20.01 kg/(m^2) as calculated from the following:    Height as of 4/12/18: 5' 4.5\" (1.638 m).    Weight as of this encounter: 118 lb 6.4 oz (53.7 kg).      Health Maintenance that is potentially due pending provider review:  Ramakrishna Lao MA  2:17 PM 9/5/2018  .    "

## 2018-09-05 NOTE — MR AVS SNAPSHOT
After Visit Summary   9/5/2018    Katharine Harmon    MRN: [id number]           Patient Information     Date Of Birth          1960        Visit Information        Provider Department      9/5/2018 2:00 PM Chicho Koroma MD [facility]        Today's Diagnoses     Hypothyroidism, unspecified type    -  1    Encounter for screening mammogram for breast cancer        Essential hypertension        Encounter for hepatitis C screening test for low risk patient        Screening for HIV (human immunodeficiency virus)           Follow-ups after your visit        Future tests that were ordered for you today     Open Future Orders        Priority Expected Expires Ordered    *MA Screening Digital Bilateral Routine  9/5/2019 9/5/2018            Who to contact     If you have questions or need follow up information about today's clinic visit or your schedule please contact [facility] directly at [phone].  Normal or non-critical lab and imaging results will be communicated to you by MyChart, letter or phone within 4 business days after the clinic has received the results. If you do not hear from us within 7 days, please contact the clinic through MyChart or phone. If you have a critical or abnormal lab result, we will notify you by phone as soon as possible.  Submit refill requests through [employer] or call your pharmacy and they will forward the refill request to us. Please allow 3 business days for your refill to be completed.          Additional Information About Your Visit        Care EveryWhere ID     This is your Care EveryWhere ID. This could be used by other organizations to access your [place] medical records  [id number]        Your Vitals Were     Pulse Temperature Respirations BMI (Body Mass Index)          68 97.5  F (36.4  C) (Tympanic) 16 20.01 kg/m2         Blood Pressure from Last 3 Encounters:   09/05/18 (!) 144/96   04/12/18 (!) 152/98   02/12/18  162/90    Weight from Last 3 Encounters:   09/05/18 118 lb 6.4 oz (53.7 kg)   04/12/18 134 lb 12.8 oz (61.1 kg)   02/12/18 135 lb (61.2 kg)              We Performed the Following     **Hepatitis C Screen Reflex to RNA FUTURE anytime     HIV Antigen Antibody Combo     TSH with free T4 reflex          Today's Medication Changes          These changes are accurate as of 9/5/18  2:56 PM.  If you have any questions, ask your nurse or doctor.               These medicines have changed or have updated prescriptions.        Dose/Directions    hydrochlorothiazide 12.5 MG Tabs tablet   This may have changed:  additional instructions   Used for:  Essential hypertension   Changed by:  Chicho Koroma MD        Dose:  12.5 mg   Take 1 tablet (12.5 mg) by mouth daily   Quantity:  90 tablet   Refills:  3         Stop taking these medicines if you haven't already. Please contact your care team if you have questions.     escitalopram 5 MG tablet   Commonly known as:  LEXAPRO   Stopped by:  Chicho Koroma MD           traZODone 50 MG tablet   Commonly known as:  DESYREL   Stopped by:  Chicho Koroma MD                Where to get your medicines      These medications were sent to [facility] #[id number] [place] [address] [[place]][address] [address]     Phone:  [phone]     hydrochlorothiazide 12.5 MG Tabs tablet                Primary Care Provider Office Phone # Fax #    Chicho Koroma [phone] [address] [place] [phone]        Equal Access to Services     [facility] AH: Hadii aad ku hadasho Soomaali, waaxda luqadaha, qaybta kaalmada adeegyada, waxay olivain haychingn amber jackson'elmira rosenberg. So [facility] [phone].    ATENCIÓN: Si habla español, tiene a baptiste disposición servicios gratuitos de asistencia lingüística. Llame al [phone].    We comply with applicable federal civil rights laws and [place] laws. We do not discriminate on the basis of  race, color, national origin, age, disability, sex, sexual orientation, or gender identity.            Thank you!     Thank you for choosing [facility]  for your care. Our goal is always to provide you with excellent care. Hearing back from our patients is one way we can continue to improve our services. Please take a few minutes to complete the written survey that you may receive in the mail after your visit with us. Thank you!             Your Updated Medication List - Protect others around you: Learn how to safely use, store and throw away your medicines at www.disposemymeds.org.          This list is accurate as of 9/5/18  2:56 PM.  Always use your most recent med list.                   Brand Name Dispense Instructions for use Diagnosis    hydrochlorothiazide 12.5 MG Tabs tablet     90 tablet    Take 1 tablet (12.5 mg) by mouth daily    Essential hypertension       levothyroxine 88 MCG tablet    SYNTHROID/LEVOTHROID    90 tablet    TAKE ONE TABLET BY MOUTH ONCE DAILY    Hypothyroidism       metoprolol succinate 50 MG 24 hr tablet    TOPROL-XL    90 tablet    Take 1 tablet (50 mg) by mouth daily DUE FOR BLOOD PRESSURE CHECK July 2018    Essential hypertension       ONE DAILY WOMENS 50+ PO      Take 1 tablet by mouth every evening        Vitamin D-3 1000 units Caps      Take 2 capsules by mouth daily

## 2018-09-06 LAB
HCV AB SERPL QL IA: NONREACTIVE
HIV 1+2 AB+HIV1 P24 AG SERPL QL IA: NONREACTIVE

## 2018-09-06 RX ORDER — LEVOTHYROXINE SODIUM 50 UG/1
50 TABLET ORAL DAILY
Qty: 90 TABLET | Refills: 3 | Status: SHIPPED | OUTPATIENT
Start: 2018-09-06 | End: 2019-02-25

## 2018-09-06 ASSESSMENT — ANXIETY QUESTIONNAIRES: GAD7 TOTAL SCORE: 2

## 2018-09-06 ASSESSMENT — PATIENT HEALTH QUESTIONNAIRE - PHQ9: SUM OF ALL RESPONSES TO PHQ QUESTIONS 1-9: 0

## 2018-09-11 ENCOUNTER — TELEPHONE (OUTPATIENT)
Dept: FAMILY MEDICINE | Facility: CLINIC | Age: 58
End: 2018-09-11

## 2018-09-11 DIAGNOSIS — R92.30 DENSE BREAST TISSUE ON MAMMOGRAM: Primary | ICD-10-CM

## 2018-09-11 NOTE — TELEPHONE ENCOUNTER
"Reason for Call: Request for an order or referral:    Order or referral being requested: Katharine talked to her insurance to see if they would cover a 3D Mammogram and they only will if the doctor orders it. She says she has very dense breasts and always has to come back \"for the other one\". She says if he puts in the order, insurance will cover    Date needed: at your convenience. She is aware that Dr Koroma is not in the office today.       Phone number Patient can be reached at:  Home number on file [phone] (home)    Best Time:  anytime    Can we leave a detailed message on this number?  YES    Call taken on 9/11/2018 at 10:19 AM by Katharine Suarez    "

## 2018-09-12 PROBLEM — R92.30 DENSE BREAST TISSUE ON MAMMOGRAM: Status: ACTIVE | Noted: 2018-09-12

## 2018-10-29 ENCOUNTER — TELEPHONE (OUTPATIENT)
Dept: FAMILY MEDICINE | Facility: CLINIC | Age: 58
End: 2018-10-29

## 2018-10-29 NOTE — TELEPHONE ENCOUNTER
Attempted to contact pt. No answer. VM full. Pt's BP was elevated at last apt. Please inquire if she has been monitoring BP and if she is able to get us some updated readings. If not would she be willing to come in for a nurse only BP check with one of the clinic RN's or she could stop into the pharmacy. Please inquire and schedule if needed when she returns call. Thanks.     Brandy Lao MA  1:21 PM 10/29/2018

## 2018-11-14 ENCOUNTER — OFFICE VISIT (OUTPATIENT)
Dept: FAMILY MEDICINE | Facility: CLINIC | Age: 58
End: 2018-11-14
Payer: COMMERCIAL

## 2018-11-14 VITALS
TEMPERATURE: 96.5 F | HEART RATE: 72 BPM | SYSTOLIC BLOOD PRESSURE: 116 MMHG | BODY MASS INDEX: 21.5 KG/M2 | RESPIRATION RATE: 16 BRPM | DIASTOLIC BLOOD PRESSURE: 70 MMHG | WEIGHT: 127.2 LBS

## 2018-11-14 DIAGNOSIS — F41.9 ANXIETY: ICD-10-CM

## 2018-11-14 DIAGNOSIS — E03.9 HYPOTHYROIDISM, UNSPECIFIED TYPE: Chronic | ICD-10-CM

## 2018-11-14 DIAGNOSIS — G47.19 EXCESSIVE DAYTIME SLEEPINESS: ICD-10-CM

## 2018-11-14 DIAGNOSIS — I10 ESSENTIAL HYPERTENSION: ICD-10-CM

## 2018-11-14 DIAGNOSIS — R71.8 ELEVATED RED BLOOD CELL COUNT: Primary | ICD-10-CM

## 2018-11-14 LAB
ANION GAP SERPL CALCULATED.3IONS-SCNC: 5 MMOL/L (ref 3–14)
BUN SERPL-MCNC: 26 MG/DL (ref 7–30)
CALCIUM SERPL-MCNC: 9 MG/DL (ref 8.5–10.1)
CHLORIDE SERPL-SCNC: 101 MMOL/L (ref 94–109)
CO2 SERPL-SCNC: 33 MMOL/L (ref 20–32)
CREAT SERPL-MCNC: 0.77 MG/DL (ref 0.52–1.04)
ERYTHROCYTE [DISTWIDTH] IN BLOOD BY AUTOMATED COUNT: 13.6 % (ref 10–15)
GFR SERPL CREATININE-BSD FRML MDRD: 77 ML/MIN/1.7M2
GLUCOSE SERPL-MCNC: 96 MG/DL (ref 70–99)
HCT VFR BLD AUTO: 45.6 % (ref 35–47)
HGB BLD-MCNC: 15.4 G/DL (ref 11.7–15.7)
MCH RBC QN AUTO: 31.2 PG (ref 26.5–33)
MCHC RBC AUTO-ENTMCNC: 33.8 G/DL (ref 31.5–36.5)
MCV RBC AUTO: 92 FL (ref 78–100)
PLATELET # BLD AUTO: 321 10E9/L (ref 150–450)
POTASSIUM SERPL-SCNC: 4.1 MMOL/L (ref 3.4–5.3)
RBC # BLD AUTO: 4.94 10E12/L (ref 3.8–5.2)
SODIUM SERPL-SCNC: 139 MMOL/L (ref 133–144)
T4 FREE SERPL-MCNC: 0.86 NG/DL (ref 0.76–1.46)
TSH SERPL DL<=0.005 MIU/L-ACNC: 8.63 MU/L (ref 0.4–4)
WBC # BLD AUTO: 7.8 10E9/L (ref 4–11)

## 2018-11-14 PROCEDURE — 99214 OFFICE O/P EST MOD 30 MIN: CPT | Performed by: FAMILY MEDICINE

## 2018-11-14 PROCEDURE — 80048 BASIC METABOLIC PNL TOTAL CA: CPT | Performed by: FAMILY MEDICINE

## 2018-11-14 PROCEDURE — 84443 ASSAY THYROID STIM HORMONE: CPT | Performed by: FAMILY MEDICINE

## 2018-11-14 PROCEDURE — 85027 COMPLETE CBC AUTOMATED: CPT | Performed by: FAMILY MEDICINE

## 2018-11-14 PROCEDURE — 36415 COLL VENOUS BLD VENIPUNCTURE: CPT | Performed by: FAMILY MEDICINE

## 2018-11-14 PROCEDURE — 84439 ASSAY OF FREE THYROXINE: CPT | Performed by: FAMILY MEDICINE

## 2018-11-14 ASSESSMENT — PAIN SCALES - GENERAL: PAINLEVEL: NO PAIN (0)
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
## 2018-11-14 NOTE — NURSING NOTE
"Chief Complaint   Patient presents with     Hypertension       Initial /70 (BP Location: Right arm, Patient Position: Chair, Cuff Size: Adult Regular)  Pulse 72  Temp 96.5  F (35.8  C) (Tympanic)  Resp 16  Wt 127 lb 3.2 oz (57.7 kg)  BMI 21.5 kg/m2 Estimated body mass index is 21.5 kg/(m^2) as calculated from the following:    Height as of 4/12/18: 5' 4.5\" (1.638 m).    Weight as of this encounter: 127 lb 3.2 oz (57.7 kg).    Patient presents to the clinic using No DME    Health Maintenance that is potentially due pending provider review:  Mammo - pt informed    Brandy Lao MA  9:05 AM 11/14/2018  .      "

## 2018-11-14 NOTE — MR AVS SNAPSHOT
After Visit Summary   11/14/2018    Katharine Harmon    MRN: [id number]           Patient Information     Date Of Birth          1960        Visit Information        Provider Department      11/14/2018 8:40 AM Chicho Koroma MD [facility]        Today's Diagnoses     Elevated red blood cell count    -  1    Excessive daytime sleepiness        Hypothyroidism, unspecified type        Essential hypertension        Anxiety           Follow-ups after your visit        Additional Services     SLEEP EVALUATION & MANAGEMENT REFERRAL - [facility]  [phone] (Age 2 and up)       Please be aware that coverage of these services is subject to the terms and limitations of your health insurance plan.  Call member services at your health plan with any benefit or coverage questions.      Please bring the following to your appointment:    >>   List of current medications   >>   This referral request   >>   Any documents/labs given to you for this referral                      Follow-up notes from your care team     Return in about 6 weeks (around 12/26/2018) for Routine Visit.      Future tests that were ordered for you today     Open Future Orders        Priority Expected Expires Ordered    SLEEP EVALUATION & MANAGEMENT REFERRAL - [facility]  [phone] (Age 2 and up) Routine  11/14/2019 11/14/2018            Who to contact     If you have questions or need follow up information about today's clinic visit or your schedule please contact [facility] directly at [phone].  Normal or non-critical lab and imaging results will be communicated to you by MyChart, letter or phone within 4 business days after the clinic has received the results. If you do not hear from us within 7 days, please contact the clinic through MyChart or phone. If you have a critical or abnormal lab result, we will notify you by phone as soon  as possible.  Submit refill requests through [employer] or call your pharmacy and they will forward the refill request to us. Please allow 3 business days for your refill to be completed.          Additional Information About Your Visit        Care EveryWhere ID     This is your Care EveryWhere ID. This could be used by other organizations to access your [place] medical records  [id number]        Your Vitals Were     Pulse Temperature Respirations BMI (Body Mass Index)          72 96.5  F (35.8  C) (Tympanic) 16 21.5 kg/m2         Blood Pressure from Last 3 Encounters:   11/14/18 116/70   09/05/18 (!) 144/96   04/12/18 (!) 152/98    Weight from Last 3 Encounters:   11/14/18 127 lb 3.2 oz (57.7 kg)   09/05/18 118 lb 6.4 oz (53.7 kg)   04/12/18 134 lb 12.8 oz (61.1 kg)              We Performed the Following     Basic metabolic panel  (Ca, Cl, CO2, Creat, Gluc, K, Na, BUN)     CBC with platelets     TSH with free T4 reflex          Today's Medication Changes          These changes are accurate as of 11/14/18  9:31 AM.  If you have any questions, ask your nurse or doctor.               Start taking these medicines.        Dose/Directions    FLUoxetine 20 MG capsule   Commonly known as:  PROzac   Used for:  Anxiety   Started by:  Chicho Koroma MD        Dose:  20 mg   Take 1 capsule (20 mg) by mouth daily   Quantity:  30 capsule   Refills:  2         These medicines have changed or have updated prescriptions.        Dose/Directions    levothyroxine 50 MCG tablet   Commonly known as:  SYNTHROID/LEVOTHROID   This may have changed:  Another medication with the same name was removed. Continue taking this medication, and follow the directions you see here.   Used for:  Hypothyroidism, unspecified type   Changed by:  Chicho Koroma MD        Dose:  50 mcg   Take 1 tablet (50 mcg) by mouth daily   Quantity:  90 tablet   Refills:  3            Where to get your medicines      These medications were sent to [facility] [address] [place] [address] [address] [facility]  [address] [place] [address]     Phone:  [phone]     FLUoxetine 20 MG capsule                Primary Care Provider Office Phone # Fax #    Chicho Koroma [phone] [address] [place] [phone]        Equal Access to Services     [facility] : Hadii aad ku hadasho Soomaali, waaxda luqadaha, qaybta kaalmada adeegyada, waxay idiin hayaan adeeg khradha lageraldine . So [facility] [phone].    ATENCIÓN: Si habla español, tiene a baptiste disposición servicios gratuitos de asistencia lingüística. Katiana al [phone].    We comply with applicable federal civil rights laws and [place] laws. We do not discriminate on the basis of race, color, national origin, age, disability, sex, sexual orientation, or gender identity.            Thank you!     Thank you for choosing [facility]  for your care. Our goal is always to provide you with excellent care. Hearing back from our patients is one way we can continue to improve our services. Please take a few minutes to complete the written survey that you may receive in the mail after your visit with us. Thank you!             Your Updated Medication List - Protect others around you: Learn how to safely use, store and throw away your medicines at www.disposemymeds.org.          This list is accurate as of 11/14/18  9:31 AM.  Always use your most recent med list.                   Brand Name Dispense Instructions for use Diagnosis    FLUoxetine 20 MG capsule    PROzac    30 capsule    Take 1 capsule (20 mg) by mouth daily    Anxiety       hydrochlorothiazide 12.5 MG Tabs tablet     90 tablet    Take 1 tablet (12.5 mg) by mouth daily    Essential hypertension       levothyroxine 50 MCG tablet    SYNTHROID/LEVOTHROID    90 tablet    Take 1 tablet (50 mcg) by mouth daily    Hypothyroidism, unspecified type       metoprolol succinate 50 MG 24 hr tablet    TOPROL-XL    90 tablet     Take 1 tablet (50 mg) by mouth daily DUE FOR BLOOD PRESSURE CHECK July 2018    Essential hypertension       ONE DAILY WOMENS 50+ PO      Take 1 tablet by mouth every evening        Vitamin D-3 1000 units Caps      Take 2 capsules by mouth daily

## 2018-11-23 ENCOUNTER — TELEPHONE (OUTPATIENT)
Dept: FAMILY MEDICINE | Facility: CLINIC | Age: 58
End: 2018-11-23

## 2018-11-23 ENCOUNTER — HOSPITAL ENCOUNTER (EMERGENCY)
Facility: CLINIC | Age: 58
Discharge: HOME | End: 2018-11-23
Attending: EMERGENCY MEDICINE | Admitting: EMERGENCY MEDICINE
Payer: COMMERCIAL

## 2018-11-23 VITALS
WEIGHT: 120 LBS | OXYGEN SATURATION: 99 % | HEART RATE: 47 BPM | SYSTOLIC BLOOD PRESSURE: 136 MMHG | TEMPERATURE: 97.8 F | DIASTOLIC BLOOD PRESSURE: 96 MMHG | RESPIRATION RATE: 7 BRPM | BODY MASS INDEX: 20.28 KG/M2

## 2018-11-23 DIAGNOSIS — I10 ESSENTIAL HYPERTENSION: ICD-10-CM

## 2018-11-23 DIAGNOSIS — E03.9 HYPOTHYROIDISM, UNSPECIFIED TYPE: Chronic | ICD-10-CM

## 2018-11-23 DIAGNOSIS — R00.1 BRADYCARDIA: ICD-10-CM

## 2018-11-23 LAB
ANION GAP SERPL CALCULATED.3IONS-SCNC: 7 MMOL/L (ref 3–14)
BASOPHILS # BLD AUTO: 0.1 10E9/L (ref 0–0.2)
BASOPHILS NFR BLD AUTO: 0.7 %
BUN SERPL-MCNC: 14 MG/DL (ref 7–30)
CALCIUM SERPL-MCNC: 8.2 MG/DL (ref 8.5–10.1)
CHLORIDE SERPL-SCNC: 105 MMOL/L (ref 94–109)
CO2 SERPL-SCNC: 29 MMOL/L (ref 20–32)
CREAT SERPL-MCNC: 0.7 MG/DL (ref 0.52–1.04)
DIFFERENTIAL METHOD BLD: NORMAL
EOSINOPHIL # BLD AUTO: 0.2 10E9/L (ref 0–0.7)
EOSINOPHIL NFR BLD AUTO: 2.4 %
ERYTHROCYTE [DISTWIDTH] IN BLOOD BY AUTOMATED COUNT: 13.2 % (ref 10–15)
GFR SERPL CREATININE-BSD FRML MDRD: 87 ML/MIN/1.7M2
GLUCOSE SERPL-MCNC: 108 MG/DL (ref 70–99)
HCT VFR BLD AUTO: 44.6 % (ref 35–47)
HGB BLD-MCNC: 14.9 G/DL (ref 11.7–15.7)
IMM GRANULOCYTES # BLD: 0 10E9/L (ref 0–0.4)
IMM GRANULOCYTES NFR BLD: 0.2 %
LYMPHOCYTES # BLD AUTO: 3.2 10E9/L (ref 0.8–5.3)
LYMPHOCYTES NFR BLD AUTO: 32.8 %
MCH RBC QN AUTO: 31.6 PG (ref 26.5–33)
MCHC RBC AUTO-ENTMCNC: 33.4 G/DL (ref 31.5–36.5)
MCV RBC AUTO: 95 FL (ref 78–100)
MONOCYTES # BLD AUTO: 0.7 10E9/L (ref 0–1.3)
MONOCYTES NFR BLD AUTO: 7 %
NEUTROPHILS # BLD AUTO: 5.6 10E9/L (ref 1.6–8.3)
NEUTROPHILS NFR BLD AUTO: 56.9 %
NRBC # BLD AUTO: 0 10*3/UL
NRBC BLD AUTO-RTO: 0 /100
PLATELET # BLD AUTO: 318 10E9/L (ref 150–450)
POTASSIUM SERPL-SCNC: 4 MMOL/L (ref 3.4–5.3)
RBC # BLD AUTO: 4.72 10E12/L (ref 3.8–5.2)
SODIUM SERPL-SCNC: 141 MMOL/L (ref 133–144)
TROPONIN I SERPL-MCNC: <0.015 UG/L (ref 0–0.04)
WBC # BLD AUTO: 9.9 10E9/L (ref 4–11)

## 2018-11-23 PROCEDURE — 25000128 H RX IP 250 OP 636: Performed by: EMERGENCY MEDICINE

## 2018-11-23 PROCEDURE — 99284 EMERGENCY DEPT VISIT MOD MDM: CPT | Mod: 25 | Performed by: EMERGENCY MEDICINE

## 2018-11-23 PROCEDURE — 84484 ASSAY OF TROPONIN QUANT: CPT | Performed by: EMERGENCY MEDICINE

## 2018-11-23 PROCEDURE — 93005 ELECTROCARDIOGRAM TRACING: CPT | Performed by: EMERGENCY MEDICINE

## 2018-11-23 PROCEDURE — 93010 ELECTROCARDIOGRAM REPORT: CPT | Mod: Z6 | Performed by: EMERGENCY MEDICINE

## 2018-11-23 PROCEDURE — 85025 COMPLETE CBC W/AUTO DIFF WBC: CPT | Performed by: EMERGENCY MEDICINE

## 2018-11-23 PROCEDURE — 96360 HYDRATION IV INFUSION INIT: CPT | Performed by: EMERGENCY MEDICINE

## 2018-11-23 PROCEDURE — 80048 BASIC METABOLIC PNL TOTAL CA: CPT | Performed by: EMERGENCY MEDICINE

## 2018-11-23 PROCEDURE — 96361 HYDRATE IV INFUSION ADD-ON: CPT | Performed by: EMERGENCY MEDICINE

## 2018-11-23 RX ADMIN — SODIUM CHLORIDE 1000 ML: 9 INJECTION, SOLUTION INTRAVENOUS at 15:18

## 2018-11-23 ASSESSMENT — ENCOUNTER SYMPTOMS
SHORTNESS OF BREATH: 0
FEVER: 0
ABDOMINAL PAIN: 0

## 2018-11-23 NOTE — TELEPHONE ENCOUNTER
S-(situation): chest pain, blurry vision, low heart rate    B-(background): today    A-(assessment): received a warm call transfer as the patient is complaining of chest tightness. Patient reporting chest pain and feels like chest is tight, no jaw, shoulder, neck or arm pain, no shortness of breath, but is very tired, having blurry vision, no headache or tingling or numbness, able to stand. Took her B/P and it was 130/81, but pulse is low at 40 earlier and just took again and it is 48, says she did not take her hydrochlorothiazide or Metoprolol yet.    R-(recommendations): Advised to be seen in the ER now, told the patient can not drive self, patient agrees and will call someone to take her there and will seek the ER now.    DIEGO Burns

## 2018-11-23 NOTE — LETTER
November 23, 2018      To Whom It May Concern:      Katharine Harmon was seen in our Emergency Department today, 11/23/18.  I expect her condition to improve over the next couple days.  She may return to work tomorrow.        Sincerely,        Mikey Langston MD

## 2018-11-23 NOTE — ED AVS SNAPSHOT
[facility]    [address] [facility] [facility] [address]    Phone:  [phone]    Fax:  [phone]                                       Katharine Harmon   MRN: [id number]    Department:  [facility]   Date of Visit:  11/23/2018           Patient Information     Date Of Birth          1960        Your diagnoses for this visit were:     Bradycardia     Essential hypertension     Hypothyroidism, unspecified type        You were seen by Mikey Langston MD.        Discharge Instructions       Stop taking metoprolol  Monitor blood pressures, and heart rates at home.  Follow-up with your primary care provider, and bring a recording of your blood pressures and heart rates.  Continue your thyroid medication as discussed by your primary care provider.  Also continue fluoxetine, which is not affecting your blood pressure, or heart rate        Understanding Bradycardia    Your heart has an electrical system that sends signals to control the heartbeat. Any abnormal change in the speed or pattern of the heartbeat is called an arrhythmia. An arrhythmia that causes the heart to beat slower than normal is called bradycardia. There are many types of bradycardia. In healthy children and adults, bradycardia is often normal, particularly during sleep. Sometimes bradycardia is caused by failure of the heart s natural timer or failure of the electrical pathways within the heart. Depending on the type you have and how severe it is, you may need treatment.  What causes bradycardia?  Many things can cause bradycardia, including:     Natural aging  process    Coronary artery disease    Heart attack    Heart muscle disease    Problems with the SA node. This is the heart s natural pacemaker that starts each heartbeat.    Problems with the electrical pathways in the heart    Problems with the structure of the heart that you are born with    Infection    Use of certain  medicines    Electrolyte imbalance    Underactive thyroid     Sleep apnea    Increased pressure in the brain or stroke   Well-conditioned athletes often have a naturally slow heart rate.  What are the symptoms of bradycardia?  Bradycardia can cause a slow or irregular heartbeat. It can also make it harder for the heart to pump blood to the body. This may cause symptoms such as:    Tiredness    Weakness    Loss of ability to exercise    Shortness of breath    Dizziness or fainting    Chest pain    Heart failure  Some people with bradycardia have no symptoms at all.  How is bradycardia treated?  Treatment for bradycardia depends on the cause. It also depends on the type you have and how severe your symptoms are. If you need treatment, your options may include:    Treatment of the underlying cause. For instance, if a medicine is causing bradycardia, stopping the medicine, under your doctor s guidance, may correct the problem. Or if a condition such as an underactive thyroid is the cause, treating the thyroid may keep bradycardia from coming back.    Medicines. Medicines may be used to treat conditions that cause bradycardia.  Some medicines can also be used in the short-term to increase the heart rate. These are often not long-term solutions.     Temporary pacing. Pacing is used to help regulate your heartbeat.When the heart beats too slowly, the device sends signals to keep the heart beating at the right pace. A temporary pacemaker may be connected to the heart using wires guided through a blood vessel in your neck or leg to the heart. This is also sometimes done using special pads placed on the chest. This may be used in an emergency, as a bridge to permanent pacing, when pacing is only needed short-term, or to further evaluate your condition.    Pacemaker. This is a device that is placed permanently under the skin in your chest and connected to your heart. When the heart beats too slowly, the device sends signals to  keep the heart beating at the right pace.  What are the complications of bradycardia?  These can include:    Development of other types of arrhythmias    Heart failure. This problem occurs when the heart weakens so much that it no longer pumps blood well.    Sudden cardiac arrest. This is when the heart suddenly stops beating.  When should I call my healthcare provider?  Call your healthcare provider right away if you have any of these:    Symptoms that don t get better with treatment, or get worse    New symptoms   Date Last Reviewed: 5/1/2016 2000-2018 [employer]. [address] [place] [address]. All rights reserved. This information is not intended as a substitute for professional medical care. Always follow your healthcare professional's instructions.            24 Hour Appointment Hotline       To make an appointment at any [facility], call [phone] ([phone]). If you don't have a family doctor or clinic, we will help you find one. [facility] clinics are conveniently located to serve the needs of you and your family.             Review of your medicines      Our records show that you are taking the medicines listed below. If these are incorrect, please call your family doctor or clinic.        Dose / Directions Last dose taken    FLUoxetine 20 MG capsule   Commonly known as:  PROzac   Dose:  20 mg   Quantity:  30 capsule        Take 1 capsule (20 mg) by mouth daily   Refills:  2        hydrochlorothiazide 12.5 MG Tabs tablet   Dose:  12.5 mg   Quantity:  90 tablet        Take 1 tablet (12.5 mg) by mouth daily   Refills:  3        levothyroxine 50 MCG tablet   Commonly known as:  SYNTHROID/LEVOTHROID   Dose:  50 mcg   Quantity:  90 tablet        Take 1 tablet (50 mcg) by mouth daily   Refills:  3        ONE DAILY WOMENS 50+ PO   Dose:  1 tablet        Take 1 tablet by mouth every evening   Refills:  0        Vitamin D-3 1000 units Caps   Dose:  2 capsule        Take 2  capsules by mouth daily   Refills:  0          STOP taking        Dose Reason for stopping Comments    metoprolol succinate 50 MG 24 hr tablet   Commonly known as:  TOPROL-XL                      Procedures and tests performed during your visit     Basic metabolic panel    CBC with platelets differential    Cardiac Continuous Monitoring    EKG 12 lead    Peripheral IV catheter    Troponin I      Orders Needing Specimen Collection     None      Pending Results     No orders found from 11/21/2018 to 11/24/2018.            Pending Culture Results     No orders found from 11/21/2018 to 11/24/2018.            Pending Results Instructions     If you had any lab results that were not finalized at the time of your Discharge, you can call the ED Lab Result RN at [phone]. You will be contacted by this team for any positive Lab results or changes in treatment. The nurses are available 7 days a week from 10A to 6:30P.  You can leave a message 24 hours per day and they will return your call.        Test Results From Your Hospital Stay        11/23/2018  3:29 PM      Component Results     Component Value Ref Range & Units Status    WBC 9.9 4.0 - 11.0 10e9/L Final    RBC Count 4.72 3.8 - 5.2 10e12/L Final    Hemoglobin 14.9 11.7 - 15.7 g/dL Final    Hematocrit 44.6 35.0 - 47.0 % Final    MCV 95 78 - 100 fl Final    MCH 31.6 26.5 - 33.0 pg Final    MCHC 33.4 31.5 - 36.5 g/dL Final    RDW 13.2 10.0 - 15.0 % Final    Platelet Count 318 150 - 450 10e9/L Final    Diff Method Automated Method  Final    % Neutrophils 56.9 % Final    % Lymphocytes 32.8 % Final    % Monocytes 7.0 % Final    % Eosinophils 2.4 % Final    % Basophils 0.7 % Final    % Immature Granulocytes 0.2 % Final    Nucleated RBCs 0 0 /100 Final    Absolute Neutrophil 5.6 1.6 - 8.3 10e9/L Final    Absolute Lymphocytes 3.2 0.8 - 5.3 10e9/L Final    Absolute Monocytes 0.7 0.0 - 1.3 10e9/L Final    Absolute Eosinophils 0.2 0.0 - 0.7 10e9/L Final    Absolute Basophils  0.1 0.0 - 0.2 10e9/L Final    Abs Immature Granulocytes 0.0 0 - 0.4 10e9/L Final    Absolute Nucleated RBC 0.0  Final         11/23/2018  3:51 PM      Component Results     Component Value Ref Range & Units Status    Sodium 141 133 - 144 mmol/L Final    Potassium 4.0 3.4 - 5.3 mmol/L Final    Specimen slightly hemolyzed, potassium may be falsely elevated    Chloride 105 94 - 109 mmol/L Final    Carbon Dioxide 29 20 - 32 mmol/L Final    Anion Gap 7 3 - 14 mmol/L Final    Glucose 108 (H) 70 - 99 mg/dL Final    Urea Nitrogen 14 7 - 30 mg/dL Final    Creatinine 0.70 0.52 - 1.04 mg/dL Final    GFR Estimate 87 >60 mL/min/1.7m2 Final    Non  GFR Calc    GFR Estimate If Black >90 >60 mL/min/1.7m2 Final    African American GFR Calc    Calcium 8.2 (L) 8.5 - 10.1 mg/dL Final         11/23/2018  3:51 PM      Component Results     Component Value Ref Range & Units Status    Troponin I ES <0.015 0.000 - 0.045 ug/L Final    The 99th percentile for upper reference range is 0.045 ug/L.  Troponin values   in the range of 0.045 - 0.120 ug/L may be associated with risks of adverse   clinical events.                  Thank you for choosing [facility]       Thank you for choosing [facility] for your care. Our goal is always to provide you with excellent care. Hearing back from our patients is one way we can continue to improve our services. Please take a few minutes to complete the written survey that you may receive in the mail after you visit with us. Thank you!        Care EveryWhere ID     This is your Care EveryWhere ID. This could be used by other organizations to access your [facility] medical records  [id number]        Equal Access to Services     MINAL LORENZO : Hadii elder Arango, kory main, qaybxochitl emersonalmada sujey, harmony rosenberg. So [facility] [phone].    ATENCIÓN: Si habla español, tiene a baptiste disposición servicios gratuitos de asistencia lingüística. Llame al  [phone].    We comply with applicable federal civil rights laws and [place] laws. We do not discriminate on the basis of race, color, national origin, age, disability, sex, sexual orientation, or gender identity.            After Visit Summary       This is your record. Keep this with you and show to your community pharmacist(s) and doctor(s) at your next visit.

## 2018-11-23 NOTE — TELEPHONE ENCOUNTER
Katharine is calling to say her pulse is low and has chest discomfort.  [facility]  Clinic [facility] [place]

## 2018-11-23 NOTE — ED AVS SNAPSHOT
[facility] Department    [address] [address]    Phone:  [phone]    Fax:  [phone]                                       Katharine Harmon   MRN: [id number]    Department:  [facility] Department   Date of Visit:  11/23/2018           After Visit Summary Signature Page     I have received my discharge instructions, and my questions have been answered. I have discussed any challenges I see with this plan with the nurse or doctor.    ..........................................................................................................................................  Patient/Patient Representative Signature      ..........................................................................................................................................  Patient Representative Print Name and Relationship to Patient    ..................................................               ................................................  Date                                   Time    ..........................................................................................................................................  Reviewed by Signature/Title    ...................................................              ..............................................  Date                                               Time          22EPIC Rev 08/18

## 2018-11-23 NOTE — ED PROVIDER NOTES
History     Chief Complaint   Patient presents with     Bradycardia     states pulse in 40's today     HPI  Katharine Harmon is a 58 year old female who has past medical history significant for hypothyroidism, hypertension, anxiety, tobacco abuse, presenting to the emergency department with concerns regarding heart rate that is been in the 40s during the day today.  Patient also felt chest pressure throughout the day today, and took her blood pressure at home, and blood pressure was normal, however she noted that her heart rate was low.  Therefore, nurse hotline was contacted, and patient was instructed to come to the emergency department.  She does report that she has had changes of her thyroid medications.  Denies fever, however has had occasional chills.  Has had also occasional lightheadedness.  No shortness of breath.  No severe abdominal pains.  Patient continues on Toprol-XL.    Problem List:    Patient Active Problem List    Diagnosis Date Noted     Dense breast tissue on mammogram 09/12/2018     Priority: Medium     Anxiety 02/12/2018     Priority: Medium     Essential hypertension 02/12/2018     Priority: Medium     S/P hysterectomy 08/01/2016     Priority: Medium     For heavy bleeding.  Done in mid forties.  Both ovaries.         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  On replacement after ablation.         Vitamin D deficiency 09/16/2015     Priority: Medium     Tobacco use disorder 09/16/2015     Priority: Medium     Fracture dislocation of ankle 04/22/2014     Priority: Medium     2 surgeries.  Chronic pain.  Some disability  paperwork limiting her activity has been intermittently done.            Past Medical History:    Past Medical History:   Diagnosis Date     Diagnostic skin and sensitization tests 12/2/15 skin tests pos. for:  DM/G only.      Thyroid disease        Past Surgical History:    Past Surgical History:   Procedure Laterality Date     ARTHROSCOPY ANKLE Right 6/25/2015    Procedure: ARTHROSCOPY ANKLE;  Surgeon: Riley Hilton DPM;  Location: [facility] OR     C THYROID ABLATION  1999     HYSTERECTOMY VAGINAL      heavy menses     OPEN REDUCTION INTERNAL FIXATION ANKLE  4/23/2014    Procedure: OPEN REDUCTION INTERNAL FIXATION ANKLE;  Surgeon: Riley Hilton DPM;  Location: [facility] OR     REMOVE HARDWARE ANKLE Right 6/25/2015    Procedure: REMOVE HARDWARE ANKLE;  Surgeon: Riley Hilton DPM;  Location: [facility] OR     REPAIR CLEFT PALATE INFANT  age 6 months       Family History:    Family History   Problem Relation Age of Onset     Diabetes Maternal Grandfather        Social History:  Marital Status:   [4]  Social History   Substance Use Topics     Smoking status: Current Every Day Smoker     Packs/day: 1.00     Years: 35.00     Types: Cigarettes     Smokeless tobacco: Never Used      Comment: Currently using patches - trying to quit     Alcohol use 0.0 oz/week     0 Standard drinks or equivalent per week      Comment: rare        Medications:      Cholecalciferol (VITAMIN D-3) 1000 units CAPS   FLUoxetine (PROZAC) 20 MG capsule   hydrochlorothiazide 12.5 MG TABS tablet   levothyroxine (SYNTHROID/LEVOTHROID) 50 MCG tablet   Multiple Vitamins-Minerals (ONE DAILY WOMENS 50+ PO)         Review of Systems   Constitutional: Negative for fever.   Respiratory: Negative for shortness of breath.    Cardiovascular: Negative for chest pain.        See HPI   Gastrointestinal: Negative for abdominal pain.   All other systems reviewed and are negative.      Physical Exam   BP: (!) 152/93  Pulse: (!) 47  Heart Rate: (!) 43  Temp:  97.8  F (36.6  C)  Resp: 18  Weight: 54.4 kg (120 lb)  SpO2: 97 %      Physical Exam  BP (!) 136/96  Pulse (!) 47  Temp 97.8  F (36.6  C) (Temporal)  Resp (!) 7  Wt 54.4 kg (120 lb)  SpO2 99%  BMI 20.28 kg/m2  General: alert, interactive, in no apparent distress  Head: atraumatic  Nose: no rhinorrhea or epistaxis  Ears: no external auditory canal discharge or bleeding.    Eyes: Sclera nonicteric. Conjunctiva noninjected.   Mouth: no tonsillar erythema, edema, or exudate  Neck: supple, no palp LAD  Lungs: CTAB  CV: bradycardia, S1/S2; peripheral pulses palpable and symmetric  Abdomen: soft, nt, nd, no guarding or rebound. Positive bowel sounds  Extremities: no cyanosis or edema  Skin: no rash or diaphoresis  Neuro: strength 5/5 in UE and LEs bilaterally, sensation intact to light touch in UE and LEs bilaterally;       ED Course     ED Course     Procedures               EKG Interpretation:      Interpreted by Mikey Langston  Time reviewed: 1520  Symptoms at time of EKG: chest pressure   Rhythm: normal sinus   Rate: Bradycardia  Axis: Normal  Ectopy: none  Conduction: normal  ST Segments/ T Waves: Non-specific ST-T wave changes  Comparison to prior: new bradycardia    Clinical Impression: sinus bradycardia                Critical Care time:  none               Results for orders placed or performed during the hospital encounter of 11/23/18 (from the past 24 hour(s))   CBC with platelets differential   Result Value Ref Range    WBC 9.9 4.0 - 11.0 10e9/L    RBC Count 4.72 3.8 - 5.2 10e12/L    Hemoglobin 14.9 11.7 - 15.7 g/dL    Hematocrit 44.6 35.0 - 47.0 %    MCV 95 78 - 100 fl    MCH 31.6 26.5 - 33.0 pg    MCHC 33.4 31.5 - 36.5 g/dL    RDW 13.2 10.0 - 15.0 %    Platelet Count 318 150 - 450 10e9/L    Diff Method Automated Method     % Neutrophils 56.9 %    % Lymphocytes 32.8 %    % Monocytes 7.0 %    % Eosinophils 2.4 %    % Basophils 0.7 %    % Immature Granulocytes 0.2 %    Nucleated RBCs 0 0 /100     Absolute Neutrophil 5.6 1.6 - 8.3 10e9/L    Absolute Lymphocytes 3.2 0.8 - 5.3 10e9/L    Absolute Monocytes 0.7 0.0 - 1.3 10e9/L    Absolute Eosinophils 0.2 0.0 - 0.7 10e9/L    Absolute Basophils 0.1 0.0 - 0.2 10e9/L    Abs Immature Granulocytes 0.0 0 - 0.4 10e9/L    Absolute Nucleated RBC 0.0    Basic metabolic panel   Result Value Ref Range    Sodium 141 133 - 144 mmol/L    Potassium 4.0 3.4 - 5.3 mmol/L    Chloride 105 94 - 109 mmol/L    Carbon Dioxide 29 20 - 32 mmol/L    Anion Gap 7 3 - 14 mmol/L    Glucose 108 (H) 70 - 99 mg/dL    Urea Nitrogen 14 7 - 30 mg/dL    Creatinine 0.70 0.52 - 1.04 mg/dL    GFR Estimate 87 >60 mL/min/1.7m2    GFR Estimate If Black >90 >60 mL/min/1.7m2    Calcium 8.2 (L) 8.5 - 10.1 mg/dL   Troponin I   Result Value Ref Range    Troponin I ES <0.015 0.000 - 0.045 ug/L       Medications   0.9% sodium chloride BOLUS (0 mLs Intravenous Stopped 11/23/18 1708)       Assessments & Plan (with Medical Decision Making)  58 year old female, with past medical history significant for hypothyroidism, hypertension, anxiety, presenting to the emergency department with chest pressure.  Symptoms present over the past day.  Describes chest pressure.  Some occasional lightheadedness.  Patient arrives with normal blood pressures.  She is bradycardic with heart rates in the upper 40s.  Laboratory workup showing no significant abnormalities, with negative troponin.  EKG with sinus bradycardia, without acute ischemic appearing changes.  Patient has had changes of her Synthroid medication.  Her free T4, which I reviewed from last week shows normal levels, however at lower limit of the normal range.  Therefore, may have component of slight hypothyroidism, in addition to Toprol-XL contributing to bradycardia.    Patient continues to be slightly bradycardic, however otherwise asymptomatic.  I feel the combination of her borderline low thyroid testing, in addition to Toprol-XL is contributing to her bradycardia.   At this point, I feel patient would benefit most from holding Toprol-XL, and monitoring blood pressure and heart rates at home.  She may require change to alternative blood pressure medication if her blood pressures remain elevated, however at this point benefits outweigh risks given her bradycardia.  Patient is comfortable with this plan, and she is encouraged follow-up in the next 1-2 weeks with primary care provider.     I have reviewed the nursing notes.    I have reviewed the findings, diagnosis, plan and need for follow up with the patient.       Discharge Medication List as of 11/23/2018  5:09 PM          Final diagnoses:   Bradycardia   Essential hypertension   Hypothyroidism, unspecified type       11/23/2018   [facility] EMERGENCY DEPARTMENT     Mikey Langston MD  11/23/18 3759

## 2018-11-23 NOTE — ED NOTES
Pt has been feeling poorly for 2-3 days, fatigue, chest pressure and sleepy. She checked her blood pressure this a.m. Because of her symptoms, and noted her pulse to be slow, in the 40's.

## 2018-11-23 NOTE — DISCHARGE INSTRUCTIONS
Stop taking metoprolol  Monitor blood pressures, and heart rates at home.  Follow-up with your primary care provider, and bring a recording of your blood pressures and heart rates.  Continue your thyroid medication as discussed by your primary care provider.  Also continue fluoxetine, which is not affecting your blood pressure, or heart rate        Understanding Bradycardia    Your heart has an electrical system that sends signals to control the heartbeat. Any abnormal change in the speed or pattern of the heartbeat is called an arrhythmia. An arrhythmia that causes the heart to beat slower than normal is called bradycardia. There are many types of bradycardia. In healthy children and adults, bradycardia is often normal, particularly during sleep. Sometimes bradycardia is caused by failure of the heart s natural timer or failure of the electrical pathways within the heart. Depending on the type you have and how severe it is, you may need treatment.  What causes bradycardia?  Many things can cause bradycardia, including:     Natural aging  process    Coronary artery disease    Heart attack    Heart muscle disease    Problems with the SA node. This is the heart s natural pacemaker that starts each heartbeat.    Problems with the electrical pathways in the heart    Problems with the structure of the heart that you are born with    Infection    Use of certain medicines    Electrolyte imbalance    Underactive thyroid     Sleep apnea    Increased pressure in the brain or stroke   Well-conditioned athletes often have a naturally slow heart rate.  What are the symptoms of bradycardia?  Bradycardia can cause a slow or irregular heartbeat. It can also make it harder for the heart to pump blood to the body. This may cause symptoms such as:    Tiredness    Weakness    Loss of ability to exercise    Shortness of breath    Dizziness or fainting    Chest pain    Heart failure  Some people with bradycardia have no symptoms at  all.  How is bradycardia treated?  Treatment for bradycardia depends on the cause. It also depends on the type you have and how severe your symptoms are. If you need treatment, your options may include:    Treatment of the underlying cause. For instance, if a medicine is causing bradycardia, stopping the medicine, under your doctor s guidance, may correct the problem. Or if a condition such as an underactive thyroid is the cause, treating the thyroid may keep bradycardia from coming back.    Medicines. Medicines may be used to treat conditions that cause bradycardia.  Some medicines can also be used in the short-term to increase the heart rate. These are often not long-term solutions.     Temporary pacing. Pacing is used to help regulate your heartbeat.When the heart beats too slowly, the device sends signals to keep the heart beating at the right pace. A temporary pacemaker may be connected to the heart using wires guided through a blood vessel in your neck or leg to the heart. This is also sometimes done using special pads placed on the chest. This may be used in an emergency, as a bridge to permanent pacing, when pacing is only needed short-term, or to further evaluate your condition.    Pacemaker. This is a device that is placed permanently under the skin in your chest and connected to your heart. When the heart beats too slowly, the device sends signals to keep the heart beating at the right pace.  What are the complications of bradycardia?  These can include:    Development of other types of arrhythmias    Heart failure. This problem occurs when the heart weakens so much that it no longer pumps blood well.    Sudden cardiac arrest. This is when the heart suddenly stops beating.  When should I call my healthcare provider?  Call your healthcare provider right away if you have any of these:    Symptoms that don t get better with treatment, or get worse    New symptoms   Date Last Reviewed: 5/1/2016 2000-2018  [employer]. [address] [place] [address], [place] [address]. All rights reserved. This information is not intended as a substitute for professional medical care. Always follow your healthcare professional's instructions.

## 2019-02-13 ENCOUNTER — OFFICE VISIT (OUTPATIENT)
Dept: FAMILY MEDICINE | Facility: CLINIC | Age: 59
End: 2019-02-13
Payer: COMMERCIAL

## 2019-02-13 VITALS
BODY MASS INDEX: 22.13 KG/M2 | SYSTOLIC BLOOD PRESSURE: 124 MMHG | RESPIRATION RATE: 16 BRPM | HEART RATE: 56 BPM | DIASTOLIC BLOOD PRESSURE: 82 MMHG | TEMPERATURE: 97.5 F | WEIGHT: 132.8 LBS | HEIGHT: 65 IN

## 2019-02-13 DIAGNOSIS — E03.9 HYPOTHYROIDISM, UNSPECIFIED TYPE: Primary | Chronic | ICD-10-CM

## 2019-02-13 DIAGNOSIS — F41.9 ANXIETY: ICD-10-CM

## 2019-02-13 LAB
T4 FREE SERPL-MCNC: 1.06 NG/DL (ref 0.76–1.46)
TSH SERPL DL<=0.005 MIU/L-ACNC: 7.8 MU/L (ref 0.4–4)

## 2019-02-13 PROCEDURE — 84439 ASSAY OF FREE THYROXINE: CPT | Performed by: FAMILY MEDICINE

## 2019-02-13 PROCEDURE — 84443 ASSAY THYROID STIM HORMONE: CPT | Performed by: FAMILY MEDICINE

## 2019-02-13 PROCEDURE — 99214 OFFICE O/P EST MOD 30 MIN: CPT | Performed by: FAMILY MEDICINE

## 2019-02-13 PROCEDURE — 36415 COLL VENOUS BLD VENIPUNCTURE: CPT | Performed by: FAMILY MEDICINE

## 2019-02-13 SDOH — HEALTH STABILITY: MENTAL HEALTH: HOW OFTEN DO YOU HAVE A DRINK CONTAINING ALCOHOL?: NEVER

## 2019-02-13 ASSESSMENT — PAIN SCALES - GENERAL: PAINLEVEL: MODERATE PAIN (4)

## 2019-02-13 ASSESSMENT — PATIENT HEALTH QUESTIONNAIRE - PHQ9
SUM OF ALL RESPONSES TO PHQ QUESTIONS 1-9: 5
5. POOR APPETITE OR OVEREATING: NOT AT ALL

## 2019-02-13 ASSESSMENT — ANXIETY QUESTIONNAIRES
3. WORRYING TOO MUCH ABOUT DIFFERENT THINGS: NOT AT ALL
6. BECOMING EASILY ANNOYED OR IRRITABLE: NOT AT ALL
2. NOT BEING ABLE TO STOP OR CONTROL WORRYING: NOT AT ALL
5. BEING SO RESTLESS THAT IT IS HARD TO SIT STILL: NOT AT ALL
GAD7 TOTAL SCORE: 0
1. FEELING NERVOUS, ANXIOUS, OR ON EDGE: NOT AT ALL
7. FEELING AFRAID AS IF SOMETHING AWFUL MIGHT HAPPEN: NOT AT ALL

## 2019-02-13 ASSESSMENT — MIFFLIN-ST. JEOR: SCORE: 1189.29

## 2019-02-13 NOTE — LETTER
February 13, 2019      Katharine Harmon  [address]   [place] [address]        Dear ,    We are writing to inform you of your test results.    Notes recorded by Chicho Koroma MD on 2/13/2019 at 2:11 PM CST    Thyroid better than last time, T4 right in the middle of the normal range.      We can recheck in 6-12 weeks.      I hope things are going well.    Resulted Orders   TSH with free T4 reflex   Result Value Ref Range    TSH 7.80 (H) 0.40 - 4.00 mU/L   T4 free   Result Value Ref Range    T4 Free 1.06 0.76 - 1.46 ng/dL       If you have any questions or concerns, please call the clinic at the number listed above.       Sincerely,        Chicho Koroma MD

## 2019-02-13 NOTE — NURSING NOTE
"Chief Complaint   Patient presents with     Hypertension     Thyroid Disease     Depression     feeling improved.       Initial /82 (BP Location: Right arm, Patient Position: Chair, Cuff Size: Adult Regular)   Pulse 56   Temp 97.5  F (36.4  C) (Tympanic)   Resp 16   Ht 1.645 m (5' 4.75\")   Wt 60.2 kg (132 lb 12.8 oz)   BMI 22.27 kg/m   Estimated body mass index is 22.27 kg/m  as calculated from the following:    Height as of this encounter: 1.645 m (5' 4.75\").    Weight as of this encounter: 60.2 kg (132 lb 12.8 oz).    Patient presents to the clinic using No DME    Health Maintenance that is potentially due pending provider review:  Mammogram    Pt will schedule mammo appt.    Is there anyone who you would like to be able to receive your results? No  If yes have patient fill out MONICA  Teagan Castillo CMA    "

## 2019-02-13 NOTE — PROGRESS NOTES
"  SUBJECTIVE:   Katharine Harmon is a 58 year old female who presents to clinic today for the following health issues:      Depression and Anxiety Follow-Up    Status since last visit: Improved     Other associated symptoms:None    Complicating factors:     Significant life event: No     Current substance abuse: None    PHQ 4/10/2017 2/12/2018 9/5/2018   PHQ-9 Total Score 12 15 0   Q9: Suicide Ideation Not at all Not at all Not at all     CEFERINO-7 SCORE 4/10/2017 2/12/2018 9/5/2018   Total Score - - 2 (minimal anxiety)   Total Score 3 17 2       PHQ-9  English  PHQ-9   Any Language  CEFERINO-7  Suicide Assessment Five-step Evaluation and Treatment (SAFE-T)  Hypothyroidism Follow-up      Since last visit, patient describes the following symptoms: weight gain of 10 lbs, dry skin, constipation and fatigue      Amount of exercise or physical activity: 4-5 days/week for an average of greater than 60 minutes    Problems taking medications regularly: No    Medication side effects: none except has fatigued.      Diet: regular (no restrictions)    Recheck heart and blood pressure.        s :Katharine Harmon is a 58 year old female with anxiety.  Improved on fluoxetine 20mg.  Long term fills requested.  Impressed with effects, took a while, but anxiety way better    Hypothyroid: feels like she's low on levels after we decreased to 50 from 88.  Due for recheck    Problem list, med list, additional histories reviewed and updated, as indicated.      No cp or sob    O:/82 (BP Location: Right arm, Patient Position: Chair, Cuff Size: Adult Regular)   Pulse 56   Temp 97.5  F (36.4  C) (Tympanic)   Resp 16   Ht 1.645 m (5' 4.75\")   Wt 60.2 kg (132 lb 12.8 oz)   BMI 22.27 kg/m    GEN: Alert and oriented, in no acute distress  CV: RRR, no murmur  Neck: neck supple without mass or lymphadenopathy    A: anxiety, improved       Hypothyroid, re check labs    P: fills long term on prozac.  Recheck thyroid.     F/u depends on results.  "

## 2019-02-14 ASSESSMENT — ANXIETY QUESTIONNAIRES: GAD7 TOTAL SCORE: 0

## 2019-02-25 ENCOUNTER — TELEPHONE (OUTPATIENT)
Dept: FAMILY MEDICINE | Facility: CLINIC | Age: 59
End: 2019-02-25

## 2019-02-25 DIAGNOSIS — E03.9 HYPOTHYROIDISM, UNSPECIFIED TYPE: Chronic | ICD-10-CM

## 2019-02-25 RX ORDER — LEVOTHYROXINE SODIUM 50 UG/1
50 TABLET ORAL DAILY
Qty: 102 TABLET | Refills: 3 | Status: SHIPPED | OUTPATIENT
Start: 2019-02-25 | End: 2019-08-12

## 2019-02-25 NOTE — TELEPHONE ENCOUNTER
Reason for call:  Patient reporting a symptom    Symptom or request: Pt is on thyroid medication. She got a letter but it didn't say what dose to be on. She has been on Synthroid 50mcg daily except to take 2 on Thursdays.  She says her pulse hasn't gone up. It's still around 51. She is running out now so will need refill.   Phone Number patient can be reached at:  Home number on file [phone] (home)    Best Time:  anytime    Can we leave a detailed message on this number:  YES    Call taken on 2/25/2019 at 8:59 AM by Katharine Suarez

## 2019-03-11 NOTE — PROGRESS NOTES
SUBJECTIVE:   Katharine Harmon is a 58 year old female who presents to clinic today for the following health issues:    RESPIRATORY SYMPTOMS      Duration: 1 month     Description  facial pain/pressure and cough    Severity: moderate    Accompanying signs and symptoms: None    Precipitating or alleviating factors: None    Therapies tried and outcome:  Sudefed, Day/Nyquil     Cannot do nasal sprays due to cleft palate    Bug Bite       Duration: 3 days     Description (location/character/radiation): right elbow     Intensity:  moderate    Accompanying signs and symptoms: bruising     History (similar episodes/previous evaluation): None    Precipitating or alleviating factors: None    Therapies tried and outcome: None     Improving, now just bruised  No injury  Was red with a central indentation-looked like a bug bite    Problem list and histories reviewed & adjusted, as indicated.  Additional history: as documented    Patient Active Problem List   Diagnosis     Fracture dislocation of ankle     Hypothyroidism     Vitamin D deficiency     Tobacco use disorder     Cleft palate     Hyperlipidemia with target LDL less than 100     Major depressive disorder, single episode, moderate (H)     S/P hysterectomy     Chronic pain of right ankle     Anxiety     Essential hypertension     Dense breast tissue on mammogram     Past Surgical History:   Procedure Laterality Date     ARTHROSCOPY ANKLE Right 6/25/2015    Procedure: ARTHROSCOPY ANKLE;  Surgeon: Riley Hilton DPM;  Location: [facility]      THYROID ABLATION  1999     HYSTERECTOMY VAGINAL      heavy menses     OPEN REDUCTION INTERNAL FIXATION ANKLE  4/23/2014    Procedure: OPEN REDUCTION INTERNAL FIXATION ANKLE;  Surgeon: Riley Hilton DPM;  Location: [facility] OR     REMOVE HARDWARE ANKLE Right 6/25/2015    Procedure: REMOVE HARDWARE ANKLE;  Surgeon: Riley Hilton DPM;  Location: [facility]     REPAIR CLEFT PALATE INFANT  age 6 months       Social History  "    Tobacco Use     Smoking status: Former Smoker     Packs/day: 1.00     Years: 35.00     Pack years: 35.00     Types: Cigarettes     Last attempt to quit: 2018     Years since quittin.3     Smokeless tobacco: Never Used     Tobacco comment: Currently using patches - trying to quit   Substance Use Topics     Alcohol use: No     Alcohol/week: 0.0 oz     Frequency: Never     Comment: rare     Family History   Problem Relation Age of Onset     Diabetes Maternal Grandfather          Current Outpatient Medications   Medication Sig Dispense Refill     Acetaminophen (TYLENOL ARTHRITIS PAIN PO)        Cholecalciferol (VITAMIN D-3) 1000 units CAPS Take 2 capsules by mouth daily       FLUoxetine (PROZAC) 20 MG capsule Take 1 capsule (20 mg) by mouth daily 90 capsule 2     hydrochlorothiazide 12.5 MG TABS tablet Take 1 tablet (12.5 mg) by mouth daily 90 tablet 3     levothyroxine (SYNTHROID/LEVOTHROID) 50 MCG tablet Take 1 tablet (50 mcg) by mouth daily With 2 tablets one day a week. 102 tablet 3     Multiple Vitamins-Minerals (ONE DAILY WOMENS 50+ PO) Take 1 tablet by mouth every evening       Allergies   Allergen Reactions     Penicillins GI Disturbance     Labs reviewed in EPIC    Reviewed and updated as needed this visit by clinical staff       Reviewed and updated as needed this visit by Provider         ROS:  Constitutional, HEENT, cardiovascular, pulmonary, gi and gu systems are negative, except as otherwise noted.    OBJECTIVE:     /82 (Cuff Size: Adult Regular)   Pulse 52   Temp 97.9  F (36.6  C) (Tympanic)   Resp 18   Ht 1.645 m (5' 4.75\")   Wt 63 kg (139 lb)   SpO2 97%   BMI 23.31 kg/m    Body mass index is 23.31 kg/m .  GENERAL: healthy, alert and no distress  HENT: normal cephalic/atraumatic, ear canals and TM's normal, nose and mouth without ulcers or lesions, oropharynx clear, oral mucous membranes moist and sinuses: maxillary, ethmoid tenderness on bilateral  NECK: no adenopathy  RESP: " lungs clear to auscultation - no rales, rhonchi or wheezes  CV: regular rate and rhythm, normal S1 S2, no S3 or S4, no murmur, click or rub, no peripheral edema and peripheral pulses strong  ABDOMEN: soft, nontender, no hepatosplenomegaly, no masses and bowel sounds normal  SKIN: bruising distal right elbow, no erythema or warmth  PSYCH: mentation appears normal, affect normal/bright    Diagnostic Test Results:  none     ASSESSMENT/PLAN:     1. Acute sinusitis with symptoms > 10 days  Doxycycline sent to the pharmacy for ongoing symptoms.  Symptomatic care and follow up discussed.  - doxycycline monohydrate (MONODOX) 100 MG capsule; Take 1 capsule (100 mg) by mouth 2 times daily for 7 days  Dispense: 14 capsule; Refill: 0    2. Bug bite, initial encounter  Bruising present without signs or symptoms of infection.  Symptomatic care and follow up discussed.    Home care instructions were reviewed with the patient. The risks, benefits and treatment options of prescribed medications or other treatments have been discussed with the patient. The patient verbalized their understanding and should call or follow up if no improvement or if they develop further problems.    Patient Instructions   Doxycycline sent to the pharmacy for symptoms  Follow up if symptoms do not improve or worsen.    [facility] ~ [phone]  One Day Weekly- Alternating Days    [place] ~ [phone]  Every other Monday or Wednesday   & one Saturday morning a month    [place] ~ [phone]  Every Other Monday Afternoon    [place]   Every Other Monday Morning    [place] ~ [phone]  Every Monday morning  Every Tuesday afternoon  Wed, Thurs, Friday morning & afternoon          CARIDAD Lenz [facility]

## 2019-03-12 ENCOUNTER — OFFICE VISIT (OUTPATIENT)
Dept: FAMILY MEDICINE | Facility: CLINIC | Age: 59
End: 2019-03-12
Payer: COMMERCIAL

## 2019-03-12 VITALS
BODY MASS INDEX: 23.16 KG/M2 | DIASTOLIC BLOOD PRESSURE: 82 MMHG | OXYGEN SATURATION: 97 % | RESPIRATION RATE: 18 BRPM | WEIGHT: 139 LBS | TEMPERATURE: 97.9 F | HEART RATE: 56 BPM | HEIGHT: 65 IN | SYSTOLIC BLOOD PRESSURE: 138 MMHG
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
## 2019-03-12 DIAGNOSIS — J01.90 ACUTE SINUSITIS WITH SYMPTOMS > 10 DAYS: Primary | ICD-10-CM

## 2019-03-12 PROCEDURE — 99214 OFFICE O/P EST MOD 30 MIN: CPT | Performed by: NURSE PRACTITIONER

## 2019-03-12 RX ORDER — DOXYCYCLINE 100 MG/1
100 CAPSULE ORAL 2 TIMES DAILY
Qty: 14 CAPSULE | Refills: 0 | Status: SHIPPED | OUTPATIENT
Start: 2019-03-12 | End: 2019-08-07

## 2019-03-12 ASSESSMENT — MIFFLIN-ST. JEOR: SCORE: 1217.41

## 2019-03-12 NOTE — NURSING NOTE
"Chief Complaint   Patient presents with     Cough     Insect Bites       Initial /82 (Cuff Size: Adult Regular)   Pulse 52   Temp 97.9  F (36.6  C) (Tympanic)   Resp 18   Ht 1.645 m (5' 4.75\")   Wt 63 kg (139 lb)   SpO2 97%   BMI 23.31 kg/m   Estimated body mass index is 23.31 kg/m  as calculated from the following:    Height as of this encounter: 1.645 m (5' 4.75\").    Weight as of this encounter: 63 kg (139 lb).    Patient presents to the clinic using No DME    Health Maintenance that is potentially due pending provider review:  Mammogram    Gave pt phone number/pended order to schedule mammo and/or colonoscopy(or FIT)    Juana Billingsley CMA      "

## 2019-03-12 NOTE — PATIENT INSTRUCTIONS
Doxycycline sent to the pharmacy for symptoms  Follow up if symptoms do not improve or worsen.    [facility] Schedule  [facility] ~ [phone]  One Day Weekly- Alternating Days    [place] ~ [phone]  Every other Monday or Wednesday   & one Saturday morning a month    [place] ~ [phone]  Every Other Monday Afternoon    [place]   Every Other Monday Morning    [place] ~ [phone]  Every Monday morning  Every Tuesday afternoon  Wed, Thurs, Friday morning & afternoon

## 2019-08-07 ENCOUNTER — OFFICE VISIT (OUTPATIENT)
Dept: FAMILY MEDICINE | Facility: CLINIC | Age: 59
End: 2019-08-07
Payer: COMMERCIAL

## 2019-08-07 VITALS
BODY MASS INDEX: 24.99 KG/M2 | WEIGHT: 149 LBS | HEART RATE: 64 BPM | DIASTOLIC BLOOD PRESSURE: 82 MMHG | SYSTOLIC BLOOD PRESSURE: 150 MMHG | TEMPERATURE: 97 F | RESPIRATION RATE: 16 BRPM

## 2019-08-07 DIAGNOSIS — E03.9 HYPOTHYROIDISM, UNSPECIFIED TYPE: Primary | Chronic | ICD-10-CM

## 2019-08-07 LAB
T4 FREE SERPL-MCNC: 0.77 NG/DL (ref 0.76–1.46)
TSH SERPL DL<=0.005 MIU/L-ACNC: 16.04 MU/L (ref 0.4–4)

## 2019-08-07 PROCEDURE — 99213 OFFICE O/P EST LOW 20 MIN: CPT | Performed by: FAMILY MEDICINE

## 2019-08-07 PROCEDURE — 84439 ASSAY OF FREE THYROXINE: CPT | Performed by: FAMILY MEDICINE

## 2019-08-07 PROCEDURE — 84443 ASSAY THYROID STIM HORMONE: CPT | Performed by: FAMILY MEDICINE

## 2019-08-07 PROCEDURE — 36415 COLL VENOUS BLD VENIPUNCTURE: CPT | Performed by: FAMILY MEDICINE

## 2019-08-07 ASSESSMENT — ANXIETY QUESTIONNAIRES
7. FEELING AFRAID AS IF SOMETHING AWFUL MIGHT HAPPEN: NOT AT ALL
2. NOT BEING ABLE TO STOP OR CONTROL WORRYING: NOT AT ALL
3. WORRYING TOO MUCH ABOUT DIFFERENT THINGS: NOT AT ALL
1. FEELING NERVOUS, ANXIOUS, OR ON EDGE: NOT AT ALL
GAD7 TOTAL SCORE: 0
6. BECOMING EASILY ANNOYED OR IRRITABLE: NOT AT ALL
5. BEING SO RESTLESS THAT IT IS HARD TO SIT STILL: NOT AT ALL

## 2019-08-07 ASSESSMENT — PATIENT HEALTH QUESTIONNAIRE - PHQ9
SUM OF ALL RESPONSES TO PHQ QUESTIONS 1-9: 3
5. POOR APPETITE OR OVEREATING: NOT AT ALL

## 2019-08-07 NOTE — PROGRESS NOTES
Subjective     Katharine Harmon is a 59 year old female who presents to clinic today for the following health issues:    HPI   Hypothyroidism Follow-up      Since last visit, patient describes the following symptoms: weight gain of 10+ lbs, dry skin and fatigue, shortness of breath, puffiness around eyes, swollen/painful feet and legs.      Amount of exercise or physical activity: Very active job    Problems taking medications regularly: No    Medication side effects: none    Diet: regular (no restrictions)      S: Katharine Harmon is a 59 year old female with hypothyroid.  Has been putting on some wt, more tired.  Feels her thyroid is off    No swelling around ankles or pitting edema    Problem list, med list, additional histories reviewed and updated, as indicated.      O:BP (!) 150/82   Pulse 64   Temp 97  F (36.1  C) (Tympanic)   Resp 16   Wt 67.6 kg (149 lb)   BMI 24.99 kg/m    GEN: Alert and oriented, in no acute distress  EXT: no edema or lesions noted in lower extremities    A: hypothyroid, recheck    P: check  Thyroid.  Will follow BP as well.

## 2019-08-07 NOTE — NURSING NOTE
"Chief Complaint   Patient presents with     Thyroid Problem     recheck       Initial BP (!) 150/82   Pulse 64   Temp 97  F (36.1  C) (Tympanic)   Resp 16   Wt 67.6 kg (149 lb)   BMI 24.99 kg/m   Estimated body mass index is 24.99 kg/m  as calculated from the following:    Height as of 3/12/19: 1.645 m (5' 4.75\").    Weight as of this encounter: 67.6 kg (149 lb).    Patient presents to the clinic using No DME    Health Maintenance that is potentially due pending provider review:  NONE    n/a    Is there anyone who you would like to be able to receive your results? No  If yes have patient fill out MONICA    "

## 2019-08-08 ASSESSMENT — ANXIETY QUESTIONNAIRES: GAD7 TOTAL SCORE: 0

## 2019-08-12 ENCOUNTER — MYC MEDICAL ADVICE (OUTPATIENT)
Dept: FAMILY MEDICINE | Facility: CLINIC | Age: 59
End: 2019-08-12

## 2019-08-12 RX ORDER — LEVOTHYROXINE SODIUM 75 UG/1
75 TABLET ORAL DAILY
Qty: 90 TABLET | Refills: 3 | Status: SHIPPED | OUTPATIENT
Start: 2019-08-12 | End: 2019-10-28

## 2019-09-05 ENCOUNTER — TELEPHONE (OUTPATIENT)
Dept: FAMILY MEDICINE | Facility: CLINIC | Age: 59
End: 2019-09-05

## 2019-09-05 NOTE — TELEPHONE ENCOUNTER
Panel Management Review      Patient has the following on her problem list:     Hypertension   Last three blood pressure readings:  BP Readings from Last 3 Encounters:   08/07/19 (!) 150/82   03/12/19 138/82   02/13/19 124/82     Blood pressure: MONITOR    HTN Guidelines:  Less than 140/90      Composite cancer screening  Chart review shows that this patient is due/due soon for the following Mammogram  Summary:    Patient is due/failing the following:   MAMMOGRAM    Action needed:   Patient needs a order and to schedule Mammogram.    Type of outreach:    Phone, left message for patient to call back.     Questions for provider review:    None                                                                                                                                    Chantel Rees CMA       Chart routed to Care Team .

## 2019-09-06 NOTE — TELEPHONE ENCOUNTER
Pt left voice message Thursday at 4:38 that she was returning call to Dr Koroma Care Team.    Call back anytime to [phone]

## 2019-09-12 ENCOUNTER — ALLIED HEALTH/NURSE VISIT (OUTPATIENT)
Dept: NURSING | Facility: CLINIC | Age: 59
End: 2019-09-12
Payer: COMMERCIAL

## 2019-09-12 ENCOUNTER — ALLIED HEALTH/NURSE VISIT (OUTPATIENT)
Dept: FAMILY MEDICINE | Facility: CLINIC | Age: 59
End: 2019-09-12
Payer: COMMERCIAL

## 2019-09-12 ENCOUNTER — ANCILLARY PROCEDURE (OUTPATIENT)
Dept: MAMMOGRAPHY | Facility: CLINIC | Age: 59
End: 2019-09-12
Attending: FAMILY MEDICINE
Payer: COMMERCIAL

## 2019-09-12 VITALS — HEART RATE: 69 BPM | OXYGEN SATURATION: 98 %

## 2019-09-12 VITALS — DIASTOLIC BLOOD PRESSURE: 82 MMHG | SYSTOLIC BLOOD PRESSURE: 138 MMHG | HEART RATE: 64 BPM

## 2019-09-12 DIAGNOSIS — Z12.31 VISIT FOR SCREENING MAMMOGRAM: ICD-10-CM

## 2019-09-12 DIAGNOSIS — Z01.30 BP CHECK: Primary | ICD-10-CM

## 2019-09-12 DIAGNOSIS — R06.02 SHORTNESS OF BREATH ON EXERTION: Primary | ICD-10-CM

## 2019-09-12 PROCEDURE — 99207 ZZC NO CHARGE NURSE ONLY: CPT

## 2019-09-12 PROCEDURE — 77067 SCR MAMMO BI INCL CAD: CPT | Mod: TC

## 2019-09-12 PROCEDURE — 77063 BREAST TOMOSYNTHESIS BI: CPT | Mod: TC

## 2019-09-12 PROCEDURE — 99207 ZZC NO CHARGE NURSE ONLY: CPT | Performed by: FAMILY MEDICINE

## 2019-09-12 NOTE — PROGRESS NOTES
Katharine Harmon was evaluated at [facility] on September 12, 2019 at which time her blood pressure was:    BP Readings from Last 3 Encounters:   09/12/19 138/82   08/07/19 (!) 150/82   03/12/19 138/82     Pulse Readings from Last 3 Encounters:   09/12/19 64   08/07/19 64   03/12/19 56       Reviewed lifestyle modifications for blood pressure control and reduction: including making healthy food choices, managing weight, getting regular exercise, smoking cessation, reducing alcohol consumption, monitoring blood pressure regularly.     Symptoms: Shortness of breath    BP Goal:< 140/90 mmHg    BP Assessment:  BP at goal    Potential Reasons for BP too high: NA - Not applicable    BP Follow-Up Plan: Recheck BP in 6 months at pharmacy    Recommendation to Provider: patient taken to clinic for nurse visit for shortnes of breath    Note completed by: Maida Almonte Rph  [facility]  [address] [place] [address]  [phone]

## 2019-09-12 NOTE — Clinical Note
Patient arrived in pharmacy for blood pressure check 138/82 pulse 64, she reports symptoms of shortness of breath, patient agreed to nurse visit at [facility]. Maida Almonte, [facility] [id number] [place] [phone]

## 2019-09-12 NOTE — PROGRESS NOTES
"Katharine Harmon is a 59 year old female who presents with SOB with extertion.  She came to clinic for BP check because she does not feel well in general, very tired.   She thought she had the flu but wasn't sure.  Vitals WNL.  Denies cough or chest pain. Lung sounds clear. She did recently have a thyroid dose change and wondering if that is why her heart was racing and was feeling sweaty yesterday.  Advised this could be due to thyroid dosage- she was advised to f/u in a \"couple of months\" to recheck thyroid.      NURSING ASSESSMENT:  Description:  SOB with exertion  Onset/duration:  A few days  Precip. factors:  Levothyroxine dose change on 8/7/19  Associated symptoms:  Sweating increase HR at home yesterday   Improves/worsens symptoms:  Resting helps SOB  Last exam/Treatment:  8/7/19  Allergies:   Allergies   Allergen Reactions     Penicillins GI Disturbance       MEDICATIONS:   Taking medication(s) as prescribed? Yes  Taking over the counter medication(s?) No  Any medication side effects? No significant side effects    Any barriers to taking medication(s) as prescribed?  No  Medication(s) improving/managing symptoms?  No  Medication reconciliation completed: Yes      NURSING PLAN: Nursing advice to patient follow up with PCP if symptoms are not better next week to have TSH rechecked.  If SOB and chest pain go to ED or if symptoms worsen.    RECOMMENDED DISPOSITION:  Home care advice - continue to monitor. If symptoms are not improving f/u with PCP  Will comply with recommendation: Yes  If further questions/concerns or if symptoms do not improve, worsen or new symptoms develop, call your PCP or [facility] as soon as possible.      Guideline used:  Telephone Triage Protocols for Nurses, Fifth Edition, Analisa Gomes RN, RN    "

## 2019-09-18 DIAGNOSIS — I10 ESSENTIAL HYPERTENSION: ICD-10-CM

## 2019-09-18 RX ORDER — HYDROCHLOROTHIAZIDE 12.5 MG/1
TABLET ORAL
Qty: 90 TABLET | Refills: 1 | Status: SHIPPED | OUTPATIENT
Start: 2019-09-18

## 2019-10-21 ENCOUNTER — TELEPHONE (OUTPATIENT)
Dept: FAMILY MEDICINE | Facility: CLINIC | Age: 59
End: 2019-10-21

## 2019-10-21 NOTE — TELEPHONE ENCOUNTER
Reason for Call:  Other     Detailed comments: Patient thinks her thyroid medication is still not right - can she get orders to redo he thyroid labs?  Please call pt    Phone Number Patient can be reached at: Home number on file [phone] (home)    Best Time:     Can we leave a detailed message on this number? YES    Call taken on 10/21/2019 at 10:44 AM by Analisa Cotton

## 2019-10-25 DIAGNOSIS — E03.9 HYPOTHYROIDISM, UNSPECIFIED TYPE: Chronic | ICD-10-CM

## 2019-10-25 LAB
T4 FREE SERPL-MCNC: 0.84 NG/DL (ref 0.76–1.46)
TSH SERPL DL<=0.005 MIU/L-ACNC: 8.28 MU/L (ref 0.4–4)

## 2019-10-25 PROCEDURE — 36415 COLL VENOUS BLD VENIPUNCTURE: CPT | Performed by: FAMILY MEDICINE

## 2019-10-25 PROCEDURE — 84443 ASSAY THYROID STIM HORMONE: CPT | Performed by: FAMILY MEDICINE

## 2019-10-25 PROCEDURE — 84439 ASSAY OF FREE THYROXINE: CPT | Performed by: FAMILY MEDICINE

## 2019-10-28 ENCOUNTER — TELEPHONE (OUTPATIENT)
Dept: FAMILY MEDICINE | Facility: CLINIC | Age: 59
End: 2019-10-28

## 2019-10-28 DIAGNOSIS — E03.9 HYPOTHYROIDISM, UNSPECIFIED TYPE: Primary | Chronic | ICD-10-CM

## 2019-10-28 RX ORDER — LEVOTHYROXINE SODIUM 88 UG/1
88 TABLET ORAL DAILY
Qty: 90 TABLET | Refills: 3 | Status: SHIPPED | OUTPATIENT
Start: 2019-10-28 | End: 2020-10-19

## 2019-10-28 NOTE — TELEPHONE ENCOUNTER
"S-(situation): I talked with Katharine regarding result of TSH.  Told patient the result was normal.  Patient states still \"feels like crap and is tired all the time\" .  Takes nap at lunch break at work.   Short of breath when walks up stairs.    B-(background): TSH 10/25/19 is 0.84.  Currently taking levothyroxine 75 mcg    A-(assessment): tired     PLAN:  Will send this note to Dr Franci Anderson RN      "

## 2019-10-28 NOTE — TELEPHONE ENCOUNTER
Patient notified of levothyroxine increase and see Dr Koroma in a few months and will recheck lab then.  Teagan Anderson RN

## 2020-01-21 NOTE — ADDENDUM NOTE
Addended by: ANNALEE GUTIERREZ on: 2/13/2019 02:49 PM     Modules accepted: Orders     Opioid Pregnancy And Lactation Text: These medications can lead to premature delivery and should be avoided during pregnancy. These medications are also present in breast milk in small amounts.

## 2020-03-02 ENCOUNTER — HEALTH MAINTENANCE LETTER (OUTPATIENT)
Age: 60
End: 2020-03-02

## 2020-10-15 DIAGNOSIS — E03.9 HYPOTHYROIDISM, UNSPECIFIED TYPE: Chronic | ICD-10-CM

## 2020-10-16 NOTE — TELEPHONE ENCOUNTER
"Requested Prescriptions   Pending Prescriptions Disp Refills     levothyroxine (SYNTHROID/LEVOTHROID) 88 MCG tablet [Pharmacy Med Name: Levothyroxine Sodium 88 MCG Oral Tablet] 90 tablet 0     Sig: Take 1 tablet by mouth once daily       Thyroid Protocol Failed - 10/15/2020 11:43 AM        Failed - Recent (12 mo) or future (30 days) visit within the authorizing provider's specialty     Patient has had an office visit with the authorizing provider or a provider within the authorizing providers department within the previous 12 mos or has a future within next 30 days. See \"Patient Info\" tab in inbasket, or \"Choose Columns\" in Meds & Orders section of the refill encounter.              Failed - Normal TSH on file in past 12 months     Recent Labs   Lab Test 10/25/19  1336   TSH 8.28*              Passed - Patient is 12 years or older        Passed - Medication is active on med list        Passed - No active pregnancy on record     If patient is pregnant or has had a positive pregnancy test, please check TSH.          Passed - No positive pregnancy test in past 12 months     If patient is pregnant or has had a positive pregnancy test, please check TSH.               "

## 2020-10-19 RX ORDER — LEVOTHYROXINE SODIUM 88 UG/1
TABLET ORAL
Qty: 30 TABLET | Refills: 1 | Status: SHIPPED | OUTPATIENT
Start: 2020-10-19

## 2020-12-20 ENCOUNTER — HEALTH MAINTENANCE LETTER (OUTPATIENT)
Age: 60
End: 2020-12-20

## 2021-04-24 ENCOUNTER — HEALTH MAINTENANCE LETTER (OUTPATIENT)
Age: 61
End: 2021-04-24

## 2021-10-03 ENCOUNTER — HEALTH MAINTENANCE LETTER (OUTPATIENT)
Age: 61
End: 2021-10-03

## 2022-01-23 ENCOUNTER — HEALTH MAINTENANCE LETTER (OUTPATIENT)
Age: 62
End: 2022-01-23

## 2022-05-15 ENCOUNTER — HEALTH MAINTENANCE LETTER (OUTPATIENT)
Age: 62
End: 2022-05-15

## 2022-09-10 ENCOUNTER — HEALTH MAINTENANCE LETTER (OUTPATIENT)
Age: 62
End: 2022-09-10

## 2023-04-30 ENCOUNTER — HEALTH MAINTENANCE LETTER (OUTPATIENT)
Age: 63
End: 2023-04-30

## 2023-06-03 ENCOUNTER — HEALTH MAINTENANCE LETTER (OUTPATIENT)
Age: 63
End: 2023-06-03